# Patient Record
Sex: MALE | Race: OTHER | Employment: FULL TIME | URBAN - METROPOLITAN AREA
[De-identification: names, ages, dates, MRNs, and addresses within clinical notes are randomized per-mention and may not be internally consistent; named-entity substitution may affect disease eponyms.]

---

## 2018-03-13 ENCOUNTER — OFFICE VISIT (OUTPATIENT)
Dept: INTERNAL MEDICINE CLINIC | Facility: CLINIC | Age: 28
End: 2018-03-13
Payer: COMMERCIAL

## 2018-03-13 VITALS
TEMPERATURE: 97.7 F | DIASTOLIC BLOOD PRESSURE: 96 MMHG | OXYGEN SATURATION: 98 % | BODY MASS INDEX: 22.72 KG/M2 | HEIGHT: 74 IN | WEIGHT: 177 LBS | HEART RATE: 95 BPM | SYSTOLIC BLOOD PRESSURE: 142 MMHG

## 2018-03-13 DIAGNOSIS — R03.0 ELEVATED BLOOD PRESSURE READING WITHOUT DIAGNOSIS OF HYPERTENSION: ICD-10-CM

## 2018-03-13 DIAGNOSIS — Z13.6 SCREENING FOR CARDIOVASCULAR CONDITION: ICD-10-CM

## 2018-03-13 DIAGNOSIS — Z00.00 ROUTINE GENERAL MEDICAL EXAMINATION AT A HEALTH CARE FACILITY: ICD-10-CM

## 2018-03-13 DIAGNOSIS — E10.9 TYPE 1 DIABETES MELLITUS WITHOUT COMPLICATION (HCC): Primary | ICD-10-CM

## 2018-03-13 DIAGNOSIS — R09.81 SINUS CONGESTION: ICD-10-CM

## 2018-03-13 PROCEDURE — 99385 PREV VISIT NEW AGE 18-39: CPT | Performed by: NURSE PRACTITIONER

## 2018-03-13 RX ORDER — INSULIN GLARGINE 100 [IU]/ML
10 INJECTION, SOLUTION SUBCUTANEOUS
Qty: 10 ML | Refills: 0 | Status: SHIPPED | OUTPATIENT
Start: 2018-03-13 | End: 2018-08-20 | Stop reason: SDUPTHER

## 2018-03-13 RX ORDER — FLUTICASONE PROPIONATE 50 MCG
2 SPRAY, SUSPENSION (ML) NASAL 2 TIMES DAILY
Qty: 16 G | Refills: 0 | Status: SHIPPED | OUTPATIENT
Start: 2018-03-13 | End: 2018-09-04

## 2018-03-13 RX ORDER — INSULIN GLARGINE 100 [IU]/ML
INJECTION, SOLUTION SUBCUTANEOUS
COMMUNITY
End: 2018-03-13 | Stop reason: SDUPTHER

## 2018-03-13 NOTE — PATIENT INSTRUCTIONS
Thank you for enrolling in [facility]  Please follow the instructions below to securely access your online medical record  OnDeckhart allows you to send messages to your doctor, view your test results, renew your prescriptions, schedule appointments, and more  [address] uses Single Sign on (SSO) Technology for you to log in and access our [facility] SPECIALTY [facility], including WOMN  No more remembering multiple user names and passwords! We are going to guide you through, step by step, to help you set up your [employer] account which will provide access to your OnDeckhart account  How Do I Sign Up? 1  In your Internet browser, go to [email or web address]       2  Click on the   Mix[employer] patient account and then click Dont have an                 Account? Create one now      3  Enter your demographic information and chose a user name (email address) and password  Think of one that is secure and easy to remember  Enter a Referral code if you have one (this is not your OnDeckhart code ) Accept the Terms and Conditions and the Privacy Policy  4  Select your security questions that you will use to reset your password should you forget it  Click Submit  5  Enter your Lvmaet Activation Code exactly as it appears below  You will not need to use this code after you have completed the sign-up process  If you do not sign up before the expiration date, you must request a new code  WOMN Activation Code: [email or web address]  Expires: 3/16/2018 10:41 AM    6  Confirm your email address  An email confirmation was sent to you  Please open that email and click Confirm your Email   You should then be redirected to our [employer] Single sign on page, where you will log on with the user name and password you created! Proceed to the WOMN Icon to view your personal health information          Additional Information  If you have questions, you can e-mail patient  [email or web address] or call [phone] to talk to our customer support staff  Remember, MyChart is NOT to be used for urgent needs  For medical emergencies, dial 911

## 2018-03-13 NOTE — PROGRESS NOTES
Assessment/Plan:    Type 1 diabetes mellitus without complication (HCC)  Blood sugars are stable on insulin  Pt uses basal insulin daily and counts carbs for management of aspart with meals  I have ordered an A1C as given a referral to endocrinology to establish care  I did discuss with pt that as long as his sugars are well controlled we can manage DM in the primary care setting but it is good to establish care with endo in the event that he should ever require further intervention  Elevated blood pressure reading without diagnosis of hypertension  As noted in HPI we did review heart healthy diet and discuss increasing activity but I am having pt return in 2 months to recheck Bp  Pt does have bp cuff at home and I also asked that he check his bp a few times a week and record readings  Sinus congestion  Prescribed flonase prn        Diagnoses and all orders for this visit:    Type 1 diabetes mellitus without complication ([facility] [address] )  -     Comprehensive metabolic panel; Future  -     HEMOGLOBIN A1C W/ EAG ESTIMATION; Future  -     Ambulatory referral to Endocrinology; Future    Routine general medical examination at a health care facility  -     CBC and differential; Future    Screening for cardiovascular condition  -     Lipid panel; Future    Sinus congestion  -     fluticasone (FLONASE) 50 mcg/act nasal spray; 2 sprays into each nostril 2 (two) times a day    Elevated blood pressure reading without diagnosis of hypertension    Other orders  -     insulin aspart (NovoLOG) 100 units/mL injection; Inject under the skin 3 (three) times a day before meals  -     insulin glargine (LANTUS) 100 units/mL subcutaneous injection; Inject under the skin daily at bedtime          Subjective:      Patient ID: Hedy Aldana is a 34 y o  male  Pt is a 34y o  year old male who is here today as a new pt to establish care  PMH includes DM I diagnosed in 2015  We reviewed age based screening recommendations    Because pt is diabetic and he has family history, I have recommended CVD screening with FLP  Pt is due for A1C, is up to date with opthalmologic exams annually  We reviewed the elements of a heart healthy diet including low in saturated fats, several servings of fresh fruits/vegetables, lean protein choices, limiting processed and packaged foods and fried foods, and sufficient intake of fiber  Pt reports that he feels compliant with recommendations  Pt reports desire for increase in physical activity which is decreased during colder winter months but exercise tolerance is good  Pt does get routine dental visits and vaccines are UTD  The following portions of the patient's history were reviewed and updated as appropriate: allergies, current medications, past family history, past medical history, past social history, past surgical history and problem list     Review of Systems   Constitutional: Negative for activity change, appetite change, chills, fatigue, fever and unexpected weight change  HENT: Negative for hearing loss  Eyes: Negative for visual disturbance  Respiratory: Negative for cough, chest tightness and shortness of breath  Cardiovascular: Negative for chest pain, palpitations and leg swelling  Gastrointestinal: Negative for constipation, diarrhea, nausea and vomiting  Genitourinary: Negative for dysuria and frequency  Musculoskeletal: Negative for arthralgias and myalgias  Skin: Negative for rash  Allergic/Immunologic: Positive for environmental allergies  Neurological: Negative for dizziness, weakness, numbness and headaches  Psychiatric/Behavioral: Negative for dysphoric mood and sleep disturbance  The patient is not nervous/anxious            Objective:      /96 (BP Location: Left arm, Patient Position: Sitting, Cuff Size: Standard)   Pulse 95   Temp 97 7 °F (36 5 °C) (Tympanic)   Ht 6' 2" (1 88 m)   Wt 80 3 kg (177 lb)   SpO2 98%   BMI 22 73 kg/m² Physical Exam   Constitutional: He is oriented to person, place, and time  Vital signs are normal  He appears well-developed and well-nourished  HENT:   Right Ear: Hearing, tympanic membrane, external ear and ear canal normal    Left Ear: Hearing, tympanic membrane, external ear and ear canal normal    Nose: Mucosal edema present  Mouth/Throat: Oropharynx is clear and moist and mucous membranes are normal    Post nasal drip noted   Eyes: Conjunctivae and lids are normal  Pupils are equal, round, and reactive to light  Neck: Normal range of motion and full passive range of motion without pain  No JVD present  Carotid bruit is not present  No thyromegaly present  Cardiovascular: Normal rate, regular rhythm, S1 normal, S2 normal, normal heart sounds, intact distal pulses and normal pulses  No murmur heard  Pulmonary/Chest: Effort normal and breath sounds normal    Abdominal: Soft  Normal appearance and bowel sounds are normal  There is no hepatosplenomegaly  There is no tenderness  Musculoskeletal: Normal range of motion  He exhibits no edema  Lymphadenopathy:     He has no cervical adenopathy  Neurological: He is alert and oriented to person, place, and time  He has normal strength and normal reflexes  No sensory deficit  Skin: Skin is warm, dry and intact  Psychiatric: He has a normal mood and affect  His speech is normal and behavior is normal  Judgment and thought content normal  Cognition and memory are normal    Vitals reviewed

## 2018-03-13 NOTE — ASSESSMENT & PLAN NOTE
Blood sugars are stable on insulin  Pt uses basal insulin daily and counts carbs for management of aspart with meals  I have ordered an A1C as given a referral to endocrinology to establish care  I did discuss with pt that as long as his sugars are well controlled we can manage DM in the primary care setting but it is good to establish care with endo in the event that he should ever require further intervention

## 2018-03-13 NOTE — ASSESSMENT & PLAN NOTE
As noted in HPI we did review heart healthy diet and discuss increasing activity but I am having pt return in 2 months to recheck Bp  Pt does have bp cuff at home and I also asked that he check his bp a few times a week and record readings

## 2018-05-16 ENCOUNTER — APPOINTMENT (OUTPATIENT)
Dept: LAB | Facility: CLINIC | Age: 28
End: 2018-05-16
Payer: COMMERCIAL

## 2018-05-16 DIAGNOSIS — E10.9 TYPE 1 DIABETES MELLITUS WITHOUT COMPLICATION (HCC): ICD-10-CM

## 2018-05-16 DIAGNOSIS — Z00.00 ROUTINE GENERAL MEDICAL EXAMINATION AT A HEALTH CARE FACILITY: ICD-10-CM

## 2018-05-16 DIAGNOSIS — Z13.6 SCREENING FOR CARDIOVASCULAR CONDITION: ICD-10-CM

## 2018-05-16 LAB
ALBUMIN SERPL BCP-MCNC: 4.4 G/DL (ref 3.5–5)
ALP SERPL-CCNC: 57 U/L (ref 46–116)
ALT SERPL W P-5'-P-CCNC: 23 U/L (ref 12–78)
ANION GAP SERPL CALCULATED.3IONS-SCNC: 7 MMOL/L (ref 4–13)
AST SERPL W P-5'-P-CCNC: 11 U/L (ref 5–45)
BASOPHILS # BLD AUTO: 0.02 THOUSANDS/ΜL (ref 0–0.1)
BASOPHILS NFR BLD AUTO: 0 % (ref 0–1)
BILIRUB SERPL-MCNC: 1.05 MG/DL (ref 0.2–1)
BUN SERPL-MCNC: 20 MG/DL (ref 5–25)
CALCIUM SERPL-MCNC: 9.3 MG/DL (ref 8.3–10.1)
CHLORIDE SERPL-SCNC: 104 MMOL/L (ref 100–108)
CHOLEST SERPL-MCNC: 173 MG/DL (ref 50–200)
CO2 SERPL-SCNC: 27 MMOL/L (ref 21–32)
CREAT SERPL-MCNC: 0.95 MG/DL (ref 0.6–1.3)
EOSINOPHIL # BLD AUTO: 0.06 THOUSAND/ΜL (ref 0–0.61)
EOSINOPHIL NFR BLD AUTO: 1 % (ref 0–6)
ERYTHROCYTE [DISTWIDTH] IN BLOOD BY AUTOMATED COUNT: 12.2 % (ref 11.6–15.1)
EST. AVERAGE GLUCOSE BLD GHB EST-MCNC: 128 MG/DL
GFR SERPL CREATININE-BSD FRML MDRD: 110 ML/MIN/1.73SQ M
GLUCOSE P FAST SERPL-MCNC: 107 MG/DL (ref 65–99)
HBA1C MFR BLD: 6.1 % (ref 4.2–6.3)
HCT VFR BLD AUTO: 44.7 % (ref 36.5–49.3)
HDLC SERPL-MCNC: 61 MG/DL (ref 40–60)
HGB BLD-MCNC: 14.4 G/DL (ref 12–17)
IMM GRANULOCYTES # BLD AUTO: 0.02 THOUSAND/UL (ref 0–0.2)
IMM GRANULOCYTES NFR BLD AUTO: 0 % (ref 0–2)
LDLC SERPL CALC-MCNC: 98 MG/DL (ref 0–100)
LYMPHOCYTES # BLD AUTO: 1.23 THOUSANDS/ΜL (ref 0.6–4.47)
LYMPHOCYTES NFR BLD AUTO: 19 % (ref 14–44)
MCH RBC QN AUTO: 27.3 PG (ref 26.8–34.3)
MCHC RBC AUTO-ENTMCNC: 32.2 G/DL (ref 31.4–37.4)
MCV RBC AUTO: 85 FL (ref 82–98)
MONOCYTES # BLD AUTO: 0.46 THOUSAND/ΜL (ref 0.17–1.22)
MONOCYTES NFR BLD AUTO: 7 % (ref 4–12)
NEUTROPHILS # BLD AUTO: 4.63 THOUSANDS/ΜL (ref 1.85–7.62)
NEUTS SEG NFR BLD AUTO: 72 % (ref 43–75)
NONHDLC SERPL-MCNC: 112 MG/DL
NRBC BLD AUTO-RTO: 0 /100 WBCS
PLATELET # BLD AUTO: 192 THOUSANDS/UL (ref 149–390)
PMV BLD AUTO: 10.7 FL (ref 8.9–12.7)
POTASSIUM SERPL-SCNC: 4.2 MMOL/L (ref 3.5–5.3)
PROT SERPL-MCNC: 7.9 G/DL (ref 6.4–8.2)
RBC # BLD AUTO: 5.28 MILLION/UL (ref 3.88–5.62)
SODIUM SERPL-SCNC: 138 MMOL/L (ref 136–145)
TRIGL SERPL-MCNC: 68 MG/DL
WBC # BLD AUTO: 6.42 THOUSAND/UL (ref 4.31–10.16)

## 2018-05-16 PROCEDURE — 80053 COMPREHEN METABOLIC PANEL: CPT

## 2018-05-16 PROCEDURE — 85025 COMPLETE CBC W/AUTO DIFF WBC: CPT

## 2018-05-16 PROCEDURE — 83036 HEMOGLOBIN GLYCOSYLATED A1C: CPT

## 2018-05-16 PROCEDURE — 80061 LIPID PANEL: CPT

## 2018-05-16 PROCEDURE — 36415 COLL VENOUS BLD VENIPUNCTURE: CPT

## 2018-05-30 ENCOUNTER — OFFICE VISIT (OUTPATIENT)
Dept: INTERNAL MEDICINE CLINIC | Facility: CLINIC | Age: 28
End: 2018-05-30
Payer: COMMERCIAL

## 2018-05-30 VITALS
BODY MASS INDEX: 22.84 KG/M2 | HEIGHT: 74 IN | SYSTOLIC BLOOD PRESSURE: 136 MMHG | TEMPERATURE: 97.7 F | HEART RATE: 59 BPM | WEIGHT: 178 LBS | OXYGEN SATURATION: 99 % | DIASTOLIC BLOOD PRESSURE: 88 MMHG

## 2018-05-30 DIAGNOSIS — J34.2 DEVIATED SEPTUM: Primary | ICD-10-CM

## 2018-05-30 DIAGNOSIS — R09.81 SINUS CONGESTION: ICD-10-CM

## 2018-05-30 DIAGNOSIS — E10.9 TYPE 1 DIABETES MELLITUS WITHOUT COMPLICATION (HCC): ICD-10-CM

## 2018-05-30 DIAGNOSIS — R03.0 ELEVATED BLOOD PRESSURE READING WITHOUT DIAGNOSIS OF HYPERTENSION: ICD-10-CM

## 2018-05-30 PROCEDURE — 99214 OFFICE O/P EST MOD 30 MIN: CPT | Performed by: NURSE PRACTITIONER

## 2018-05-30 NOTE — ASSESSMENT & PLAN NOTE
Stable, A1C 6 1 controlled with insulin and carb counting  No reported events of hypoglycemia  No changes indicated

## 2018-05-30 NOTE — ASSESSMENT & PLAN NOTE
BP in office is still slightly elevated, but improved from last visit  Pt has been checking BP at home as directed and reports that his BP since resuming regular exercise is now consistently 120/80  Reinforced that he continue with lifestyle modifications as blood pressure control is important particularly in diabetics  Pt to return for f/u in 6 months

## 2018-05-30 NOTE — ASSESSMENT & PLAN NOTE
Pt was given rx for flonase at prior visit  He is using this as needed as daily use was causing nose bleeds  I did explain that exam suggests possible underlying allergies and he can try an OTC anti histamine if symptoms worsen

## 2018-05-30 NOTE — PROGRESS NOTES
Assessment/Plan:    Type 1 diabetes mellitus without complication (HCC)  Stable, A1C 6 1 controlled with insulin and carb counting  No reported events of hypoglycemia  No changes indicated  Sinus congestion  Pt was given rx for flonase at prior visit  He is using this as needed as daily use was causing nose bleeds  I did explain that exam suggests possible underlying allergies and he can try an OTC anti histamine if symptoms worsen  Deviated septum  Pt interested in pursuing surgical correction  Referral to ENT provided  Elevated blood pressure reading without diagnosis of hypertension  BP in office is still slightly elevated, but improved from last visit  Pt has been checking BP at home as directed and reports that his BP since resuming regular exercise is now consistently 120/80  Reinforced that he continue with lifestyle modifications as blood pressure control is important particularly in diabetics  Pt to return for f/u in 6 months  Diagnoses and all orders for this visit:    Deviated septum  -     Ambulatory Referral to Otolaryngology; Future    Sinus congestion    Type 1 diabetes mellitus without complication (HCC)    Elevated blood pressure reading without diagnosis of hypertension          Subjective:      Patient ID: Kenji Whitmore is a 34 y o  male  Pt is a 34y o  year old male who is seen today for 2 month follow up to management of elevated blood pressure at last visit  We reviewed a heart healthy diet and pt was advised to increase activity  Pt states he has been checking his blood pressure since his last visit and initially BP was consistently in the upper 130s/upper 80s  He started running several days per week and now he states his blood pressure is consistently around 120/80  Pt denies chest pain, shortness of breath, palpitations, headache, dizziness  He does complain of difficulty breathing through his nose while running due to his deviated septum    He says he was evaluated in college and was told he should have surgery but he never followed through with it  Blood work results reviewed  PMH of type 1 DM, A1C 6 1, lipid panel is WNL, CBC and CMP unremarkable  The following portions of the patient's history were reviewed and updated as appropriate: allergies, current medications, past family history, past medical history, past social history, past surgical history and problem list     Review of Systems   Constitutional: Negative for activity change, appetite change, chills, fatigue, fever and unexpected weight change  HENT: Negative for hearing loss  Eyes: Negative for visual disturbance  Respiratory: Negative for cough, chest tightness and shortness of breath  Cardiovascular: Negative for chest pain, palpitations and leg swelling  Gastrointestinal: Negative for constipation, diarrhea, nausea and vomiting  Allergic/Immunologic: Positive for environmental allergies  Neurological: Negative for dizziness, weakness, numbness and headaches  Psychiatric/Behavioral: Negative for dysphoric mood and sleep disturbance  The patient is not nervous/anxious  Objective:      /88   Pulse 59   Temp 97 7 °F (36 5 °C)   Ht 6' 2" (1 88 m)   Wt 80 7 kg (178 lb)   SpO2 99%   BMI 22 85 kg/m²          Physical Exam   Constitutional: He is oriented to person, place, and time  Vital signs are normal  He appears well-developed and well-nourished  He is cooperative  HENT:   Head: Normocephalic  Right Ear: Hearing, tympanic membrane, external ear and ear canal normal    Left Ear: Hearing, tympanic membrane, external ear and ear canal normal    Nose: Mucosal edema and septal deviation present  Mouth/Throat: Oropharynx is clear and moist and mucous membranes are normal    Pallor and swelling of nasal mucosa, post nasal drip noted   Eyes: Conjunctivae and lids are normal  Pupils are equal, round, and reactive to light  Neck: No JVD present   Carotid bruit is not present  Cardiovascular: Normal rate, regular rhythm, S1 normal, S2 normal, normal heart sounds and intact distal pulses  No murmur heard  Pulmonary/Chest: Effort normal and breath sounds normal    Abdominal: Soft  Normal appearance  Musculoskeletal: Normal range of motion  He exhibits no edema  Lymphadenopathy:     He has no cervical adenopathy  Neurological: He is alert and oriented to person, place, and time  He has normal strength and normal reflexes  Skin: Skin is warm, dry and intact  Psychiatric: He has a normal mood and affect  His speech is normal and behavior is normal  Judgment and thought content normal  Cognition and memory are normal    Vitals reviewed

## 2018-06-14 DIAGNOSIS — E13.9 DIABETES 1.5, MANAGED AS TYPE 1 (HCC): Primary | ICD-10-CM

## 2018-08-20 DIAGNOSIS — E10.9 TYPE 1 DIABETES MELLITUS WITHOUT COMPLICATION (HCC): ICD-10-CM

## 2018-08-20 DIAGNOSIS — E13.9 DIABETES 1.5, MANAGED AS TYPE 1 (HCC): ICD-10-CM

## 2018-08-20 RX ORDER — INSULIN GLARGINE 100 [IU]/ML
12 INJECTION, SOLUTION SUBCUTANEOUS
Qty: 10 ML | Refills: 5 | Status: SHIPPED | OUTPATIENT
Start: 2018-08-20 | End: 2020-05-21 | Stop reason: SDUPTHER

## 2018-09-04 ENCOUNTER — APPOINTMENT (OUTPATIENT)
Dept: LAB | Facility: CLINIC | Age: 28
End: 2018-09-04
Payer: COMMERCIAL

## 2018-09-04 ENCOUNTER — OFFICE VISIT (OUTPATIENT)
Dept: INTERNAL MEDICINE CLINIC | Facility: CLINIC | Age: 28
End: 2018-09-04
Payer: COMMERCIAL

## 2018-09-04 VITALS
TEMPERATURE: 97.8 F | HEART RATE: 62 BPM | OXYGEN SATURATION: 99 % | DIASTOLIC BLOOD PRESSURE: 88 MMHG | HEIGHT: 74 IN | WEIGHT: 180 LBS | BODY MASS INDEX: 23.1 KG/M2 | SYSTOLIC BLOOD PRESSURE: 138 MMHG

## 2018-09-04 DIAGNOSIS — R10.12 LUQ PAIN: ICD-10-CM

## 2018-09-04 DIAGNOSIS — R10.12 LUQ PAIN: Primary | ICD-10-CM

## 2018-09-04 DIAGNOSIS — E10.9 TYPE 1 DIABETES MELLITUS WITHOUT COMPLICATION (HCC): ICD-10-CM

## 2018-09-04 LAB
ALBUMIN SERPL BCP-MCNC: 4.4 G/DL (ref 3.5–5)
ALP SERPL-CCNC: 55 U/L (ref 46–116)
ALT SERPL W P-5'-P-CCNC: 21 U/L (ref 12–78)
AMYLASE SERPL-CCNC: 52 IU/L (ref 25–115)
ANION GAP SERPL CALCULATED.3IONS-SCNC: 7 MMOL/L (ref 4–13)
AST SERPL W P-5'-P-CCNC: 13 U/L (ref 5–45)
BASOPHILS # BLD AUTO: 0.03 THOUSANDS/ΜL (ref 0–0.1)
BASOPHILS NFR BLD AUTO: 1 % (ref 0–1)
BILIRUB SERPL-MCNC: 0.78 MG/DL (ref 0.2–1)
BUN SERPL-MCNC: 18 MG/DL (ref 5–25)
CALCIUM SERPL-MCNC: 9.3 MG/DL (ref 8.3–10.1)
CHLORIDE SERPL-SCNC: 102 MMOL/L (ref 100–108)
CO2 SERPL-SCNC: 26 MMOL/L (ref 21–32)
CREAT SERPL-MCNC: 0.93 MG/DL (ref 0.6–1.3)
EOSINOPHIL # BLD AUTO: 0.04 THOUSAND/ΜL (ref 0–0.61)
EOSINOPHIL NFR BLD AUTO: 1 % (ref 0–6)
ERYTHROCYTE [DISTWIDTH] IN BLOOD BY AUTOMATED COUNT: 12 % (ref 11.6–15.1)
GFR SERPL CREATININE-BSD FRML MDRD: 113 ML/MIN/1.73SQ M
GLUCOSE P FAST SERPL-MCNC: 116 MG/DL (ref 65–99)
HCT VFR BLD AUTO: 43 % (ref 36.5–49.3)
HGB BLD-MCNC: 13.7 G/DL (ref 12–17)
IMM GRANULOCYTES # BLD AUTO: 0.01 THOUSAND/UL (ref 0–0.2)
IMM GRANULOCYTES NFR BLD AUTO: 0 % (ref 0–2)
LIPASE SERPL-CCNC: 66 U/L (ref 73–393)
LYMPHOCYTES # BLD AUTO: 1.24 THOUSANDS/ΜL (ref 0.6–4.47)
LYMPHOCYTES NFR BLD AUTO: 29 % (ref 14–44)
MCH RBC QN AUTO: 27 PG (ref 26.8–34.3)
MCHC RBC AUTO-ENTMCNC: 31.9 G/DL (ref 31.4–37.4)
MCV RBC AUTO: 85 FL (ref 82–98)
MONOCYTES # BLD AUTO: 0.4 THOUSAND/ΜL (ref 0.17–1.22)
MONOCYTES NFR BLD AUTO: 9 % (ref 4–12)
NEUTROPHILS # BLD AUTO: 2.6 THOUSANDS/ΜL (ref 1.85–7.62)
NEUTS SEG NFR BLD AUTO: 60 % (ref 43–75)
NRBC BLD AUTO-RTO: 0 /100 WBCS
PLATELET # BLD AUTO: 201 THOUSANDS/UL (ref 149–390)
PMV BLD AUTO: 10.9 FL (ref 8.9–12.7)
POTASSIUM SERPL-SCNC: 4.2 MMOL/L (ref 3.5–5.3)
PROT SERPL-MCNC: 8.1 G/DL (ref 6.4–8.2)
RBC # BLD AUTO: 5.08 MILLION/UL (ref 3.88–5.62)
SODIUM SERPL-SCNC: 135 MMOL/L (ref 136–145)
WBC # BLD AUTO: 4.32 THOUSAND/UL (ref 4.31–10.16)

## 2018-09-04 PROCEDURE — 85025 COMPLETE CBC W/AUTO DIFF WBC: CPT

## 2018-09-04 PROCEDURE — 3008F BODY MASS INDEX DOCD: CPT | Performed by: NURSE PRACTITIONER

## 2018-09-04 PROCEDURE — 36415 COLL VENOUS BLD VENIPUNCTURE: CPT

## 2018-09-04 PROCEDURE — 80053 COMPREHEN METABOLIC PANEL: CPT

## 2018-09-04 PROCEDURE — 82150 ASSAY OF AMYLASE: CPT

## 2018-09-04 PROCEDURE — 83690 ASSAY OF LIPASE: CPT

## 2018-09-04 PROCEDURE — 99213 OFFICE O/P EST LOW 20 MIN: CPT | Performed by: NURSE PRACTITIONER

## 2018-09-04 PROCEDURE — 1036F TOBACCO NON-USER: CPT | Performed by: NURSE PRACTITIONER

## 2018-09-04 NOTE — PROGRESS NOTES
Assessment/Plan:    Type 1 diabetes mellitus without complication (HCC)  Lab Results   Component Value Date    HGBA1C 6 1 05/16/2018       No results for input(s): POCGLU in the last 72 hours  Stable, well controlled with use of insulin  Blood Sugar Average: Last 72 hrs:      LUQ pain  Exam is unremarkable  I did express to the patient that I suspect he may be having intermittent acid reflux related to his dietary changes in recent months due to travel and celebrating  I recommended that next time he experiences symptoms he try tums and see if this helps alleviate discomfort  I did also order blood work to evaluate pancreatic and liver enzymes and blood counts  Will f/u pending results  If testing is all negative and symptoms are not alleviated by tums, will discuss if further evaluation is necessary  Diagnoses and all orders for this visit:    LUQ pain  -     Comprehensive metabolic panel; Future  -     CBC and differential; Future  -     Amylase; Future  -     Lipase; Future    Type 1 diabetes mellitus without complication (HCC)          Subjective:      Patient ID: Aníbal Owens is a 34 y o  male  Pt is a 34 y o  y/o male who is seen today for evaluation of LUQ pain since late June/July  He has no associated symptoms of nausea/vomiting, diarrhea, constipation, fever/chills, appetite loss, dysuria  Pt states that when symptoms started, it was fairly regular for about 4 days and has since become more intermittent  He describes the pain as a mild discomfort that is worsened with laying flat and coughing  He states that there have been occasions when he was unable to go running because of this pain  He has PMH of type 1 diabetes controlled on insulin but he states his blood sugars have been well controlled  He does report that he has been traveling a lot over the summer so his diet has been higher in red meat and he has had more alcohol than he typically does          Abdominal Pain Pertinent negatives include no arthralgias, constipation, diarrhea, dysuria, fever, headaches, myalgias, nausea or vomiting  The following portions of the patient's history were reviewed and updated as appropriate: allergies, current medications, past family history, past medical history, past social history, past surgical history and problem list     Review of Systems   Constitutional: Negative for activity change, appetite change, chills, fatigue and fever  HENT: Negative for congestion and sore throat  Respiratory: Negative for cough and shortness of breath  Cardiovascular: Negative for chest pain, palpitations and leg swelling  Gastrointestinal: Positive for abdominal pain  Negative for blood in stool, constipation, diarrhea, nausea, rectal pain and vomiting  Genitourinary: Negative for dysuria  Musculoskeletal: Negative for arthralgias and myalgias  Skin: Negative for rash  Neurological: Negative for dizziness, light-headedness and headaches  Psychiatric/Behavioral: Negative for dysphoric mood and sleep disturbance  The patient is not nervous/anxious  Objective:      /88 (BP Location: Left arm, Patient Position: Sitting, Cuff Size: Adult)   Pulse 62   Temp 97 8 °F (36 6 °C) (Tympanic)   Ht 6' 2" (1 88 m)   Wt 81 6 kg (180 lb)   SpO2 99%   BMI 23 11 kg/m²          Physical Exam   Constitutional: He is oriented to person, place, and time  Vital signs are normal  He appears well-developed and well-nourished  He is cooperative  HENT:   Head: Normocephalic  Right Ear: Hearing and tympanic membrane normal    Left Ear: Hearing and tympanic membrane normal    Nose: Mucosal edema present  Mouth/Throat: Oropharynx is clear and moist and mucous membranes are normal    Eyes: Conjunctivae and lids are normal    Neck: Normal range of motion and full passive range of motion without pain  No JVD present  Carotid bruit is not present     Cardiovascular: Normal rate, regular rhythm, normal heart sounds and normal pulses  Pulmonary/Chest: Effort normal and breath sounds normal    Abdominal: Soft  Normal appearance and bowel sounds are normal  There is no hepatosplenomegaly  There is no tenderness  Musculoskeletal: Normal range of motion  Lymphadenopathy:        Head (right side): No submental, no submandibular, no tonsillar, no preauricular, no posterior auricular and no occipital adenopathy present  Head (left side): No submental, no submandibular, no tonsillar, no preauricular, no posterior auricular and no occipital adenopathy present  He has no cervical adenopathy  Neurological: He is alert and oriented to person, place, and time  He has normal strength and normal reflexes  Skin: Skin is warm, dry and intact  There are several scattered blue skin lesions noted; pt reports that these are new in recent months  Lesions are raised but depressible, they are non-tender, all approx 1-2 mm  Non-tender  One on the R dorasal hand below the 5th mcp joint, one on the R ventral forearm, one on the R posterior calf, and 3-4 on the left lateral low back  Psychiatric: He has a normal mood and affect  His speech is normal and behavior is normal  Judgment and thought content normal  Cognition and memory are normal    Vitals reviewed

## 2018-09-04 NOTE — ASSESSMENT & PLAN NOTE
Lab Results   Component Value Date    HGBA1C 6 1 05/16/2018       No results for input(s): POCGLU in the last 72 hours      Stable, well controlled with use of insulin  Blood Sugar Average: Last 72 hrs:

## 2018-12-11 ENCOUNTER — LAB (OUTPATIENT)
Dept: LAB | Facility: CLINIC | Age: 28
End: 2018-12-11
Payer: COMMERCIAL

## 2018-12-11 ENCOUNTER — OFFICE VISIT (OUTPATIENT)
Dept: INTERNAL MEDICINE CLINIC | Facility: CLINIC | Age: 28
End: 2018-12-11
Payer: COMMERCIAL

## 2018-12-11 VITALS
SYSTOLIC BLOOD PRESSURE: 132 MMHG | OXYGEN SATURATION: 98 % | WEIGHT: 185 LBS | TEMPERATURE: 97 F | HEART RATE: 50 BPM | HEIGHT: 74 IN | DIASTOLIC BLOOD PRESSURE: 74 MMHG | BODY MASS INDEX: 23.74 KG/M2

## 2018-12-11 DIAGNOSIS — R07.89 CHEST DISCOMFORT: Primary | ICD-10-CM

## 2018-12-11 DIAGNOSIS — R07.89 CHEST DISCOMFORT: ICD-10-CM

## 2018-12-11 DIAGNOSIS — R03.0 ELEVATED BLOOD PRESSURE READING WITHOUT DIAGNOSIS OF HYPERTENSION: ICD-10-CM

## 2018-12-11 LAB
ALBUMIN SERPL BCP-MCNC: 4.3 G/DL (ref 3.5–5)
ALP SERPL-CCNC: 50 U/L (ref 46–116)
ALT SERPL W P-5'-P-CCNC: 18 U/L (ref 12–78)
ANION GAP SERPL CALCULATED.3IONS-SCNC: 8 MMOL/L (ref 4–13)
AST SERPL W P-5'-P-CCNC: 7 U/L (ref 5–45)
BILIRUB SERPL-MCNC: 0.78 MG/DL (ref 0.2–1)
BUN SERPL-MCNC: 16 MG/DL (ref 5–25)
CALCIUM SERPL-MCNC: 9.1 MG/DL (ref 8.3–10.1)
CHLORIDE SERPL-SCNC: 105 MMOL/L (ref 100–108)
CO2 SERPL-SCNC: 29 MMOL/L (ref 21–32)
CREAT SERPL-MCNC: 0.93 MG/DL (ref 0.6–1.3)
GFR SERPL CREATININE-BSD FRML MDRD: 113 ML/MIN/1.73SQ M
GLUCOSE P FAST SERPL-MCNC: 129 MG/DL (ref 65–99)
POTASSIUM SERPL-SCNC: 4.8 MMOL/L (ref 3.5–5.3)
PROT SERPL-MCNC: 7.7 G/DL (ref 6.4–8.2)
SODIUM SERPL-SCNC: 142 MMOL/L (ref 136–145)
T4 FREE SERPL-MCNC: 1.05 NG/DL (ref 0.76–1.46)
TSH SERPL DL<=0.05 MIU/L-ACNC: 2.07 UIU/ML (ref 0.36–3.74)

## 2018-12-11 PROCEDURE — 84443 ASSAY THYROID STIM HORMONE: CPT

## 2018-12-11 PROCEDURE — 84439 ASSAY OF FREE THYROXINE: CPT

## 2018-12-11 PROCEDURE — 1036F TOBACCO NON-USER: CPT | Performed by: NURSE PRACTITIONER

## 2018-12-11 PROCEDURE — 36415 COLL VENOUS BLD VENIPUNCTURE: CPT

## 2018-12-11 PROCEDURE — 80053 COMPREHEN METABOLIC PANEL: CPT

## 2018-12-11 PROCEDURE — 99214 OFFICE O/P EST MOD 30 MIN: CPT | Performed by: NURSE PRACTITIONER

## 2018-12-11 PROCEDURE — 93000 ELECTROCARDIOGRAM COMPLETE: CPT | Performed by: NURSE PRACTITIONER

## 2018-12-11 PROCEDURE — 3008F BODY MASS INDEX DOCD: CPT | Performed by: NURSE PRACTITIONER

## 2018-12-11 RX ORDER — METOPROLOL TARTRATE 50 MG/1
50 TABLET, FILM COATED ORAL 2 TIMES DAILY
COMMUNITY
Start: 2018-12-06 | End: 2019-06-12 | Stop reason: SINTOL

## 2018-12-11 NOTE — ASSESSMENT & PLAN NOTE
Lab Results   Component Value Date    HGBA1C 6 1 05/16/2018       No results for input(s): POCGLU in the last 72 hours  Blood Sugar Average: Last 72 hrs:   blood sugar was elevated in emergency room evaluation, patient states that otherwise his blood sugars have been well controlled  He has an insulin pump and has not had any episodes of hyper or hypoglycemia  Repeat CMP ordered

## 2018-12-11 NOTE — PROGRESS NOTES
Assessment/Plan:    Type 1 diabetes mellitus without complication (HCC)  Lab Results   Component Value Date    HGBA1C 6 1 05/16/2018       No results for input(s): POCGLU in the last 72 hours  Blood Sugar Average: Last 72 hrs:   blood sugar was elevated in emergency room evaluation, patient states that otherwise his blood sugars have been well controlled  He has an insulin pump and has not had any episodes of hyper or hypoglycemia  Repeat CMP ordered  Chest discomfort  Symptoms described in HPI have not reoccurred since his ER evaluation  I did repeat an EKG in the office today which showed sinus bradycardia with questionable incomplete right bundle branch block  Patient advised to stop metoprolol  He is becoming symptomatic of fatigue heartburn and poor exercise tolerance with this medication  He does not have a history of tachycardia and his vitals in the emergency room appear to have been consistent with acute anxiety  I did repeat a CMP in add on TSH and T4 for additional evaluation  Patient is instructed to call the office if symptoms reoccur, then consider additional evaluation as indicated  Elevated blood pressure reading without diagnosis of hypertension  Note this time patient is advised to stop taking metoprolol  His heart rate is well on it and he is having adverse effects including fatigue, heartburn, and poor exercise tolerance  His blood pressure prior to today's visit have been mildly elevated  Have asked that he come back in 1-2 months and we can recheck his blood pressure  If remains elevated at that time, we will consider alternate therapy  Diagnoses and all orders for this visit:    Chest discomfort  -     POCT ECG  -     Comprehensive metabolic panel; Future  -     TSH, 3rd generation; Future  -     T4, free; Future    Elevated blood pressure reading without diagnosis of hypertension    Other orders  -     metoprolol tartrate (LOPRESSOR) 50 mg tablet;  Take 50 mg by mouth 2 (two) times a day          Subjective:      Patient ID: Sonya Moses is a 34 y o  male  Patient is a 56-year-old male here today for evaluation of abnormal symptoms that occurred recently  Patient was away on a business trip in [place] when he had 2 episodes of a rash starting from the diaphragm up  Each episode lasted only seconds and was self-limited  There is no associated pain or pressure, he had no SOB, dizziness, blurred vision, numbness, tingling, nausea or vomiting  Past medical history includes type 1 diabetes  Patient states that his blood sugar had been stable the days prior to these episodes  Patient did end up going to Urgent Care for evaluation, Urgent Care sent him to the emergency room for evaluation  His emergency room evaluation included blood work, an EKG, chest x-ray  His blood pressure in the emergency room was elevated, patient believes likely related to concern over his symptoms  His EKG showed a questionable right bundle branch block and his chest x-ray was normal   His blood work was unremarkable, his blood glucose was elevated at the time of his visit however at [address] [place] [address] Patient was started on metoprolol and discharged once his blood pressure stabilized  He states he has not had any recurrence of these symptoms since his evaluation in the emergency room  He has had increase in heartburn symptoms since starting the metoprolol however  The following portions of the patient's history were reviewed and updated as appropriate: allergies, current medications, past family history, past medical history, past social history, past surgical history and problem list     Review of Systems   Constitutional: Negative for activity change, appetite change, chills, fatigue, fever and unexpected weight change  HENT: Negative for congestion and hearing loss  Eyes: Negative for visual disturbance  Respiratory: Negative for cough, chest tightness and shortness of breath  Cardiovascular: Negative for chest pain, palpitations and leg swelling  Gastrointestinal: Negative for constipation, diarrhea, nausea and vomiting  Heartburn   Genitourinary: Negative for dysuria and frequency  Musculoskeletal: Negative for arthralgias and myalgias  Skin: Negative for rash  Allergic/Immunologic: Negative for environmental allergies  Neurological: Negative for dizziness, weakness, numbness and headaches  Psychiatric/Behavioral: Negative for dysphoric mood and sleep disturbance  The patient is not nervous/anxious  Objective:      /74   Pulse (!) 50   Temp (!) 97 °F (36 1 °C)   Ht 6' 2" (1 88 m)   Wt 83 9 kg (185 lb)   SpO2 98%   BMI 23 75 kg/m²          Physical Exam   Constitutional: He is oriented to person, place, and time  Vital signs are normal  He appears well-developed and well-nourished  He is cooperative  HENT:   Right Ear: Hearing, tympanic membrane, external ear and ear canal normal    Left Ear: Hearing, tympanic membrane, external ear and ear canal normal    Nose: Mucosal edema present  Mouth/Throat: Oropharynx is clear and moist and mucous membranes are normal    Eyes: Pupils are equal, round, and reactive to light  Conjunctivae, EOM and lids are normal    Neck: No JVD present  Carotid bruit is not present  No thyromegaly present  Cardiovascular: Regular rhythm, S1 normal, S2 normal, normal heart sounds and intact distal pulses  Bradycardia present  No murmur heard  Pulmonary/Chest: Effort normal and breath sounds normal    Abdominal: Soft  Normal appearance and bowel sounds are normal  There is no tenderness  Musculoskeletal: Normal range of motion  He exhibits no edema  Lymphadenopathy:        Head (right side): No submental, no submandibular, no tonsillar, no preauricular, no posterior auricular and no occipital adenopathy present          Head (left side): No submental, no submandibular, no tonsillar, no preauricular, no posterior auricular and no occipital adenopathy present  He has no cervical adenopathy  Neurological: He is alert and oriented to person, place, and time  He has normal strength and normal reflexes  No sensory deficit  Skin: Skin is warm, dry and intact  Psychiatric: He has a normal mood and affect  His speech is normal and behavior is normal  Judgment and thought content normal  Cognition and memory are normal    Vitals reviewed

## 2018-12-11 NOTE — ASSESSMENT & PLAN NOTE
Note this time patient is advised to stop taking metoprolol  His heart rate is well on it and he is having adverse effects including fatigue, heartburn, and poor exercise tolerance  His blood pressure prior to today's visit have been mildly elevated  Have asked that he come back in 1-2 months and we can recheck his blood pressure  If remains elevated at that time, we will consider alternate therapy

## 2018-12-11 NOTE — ASSESSMENT & PLAN NOTE
Symptoms described in HPI have not reoccurred since his ER evaluation  I did repeat an EKG in the office today which showed sinus bradycardia with questionable incomplete right bundle branch block  Patient advised to stop metoprolol  He is becoming symptomatic of fatigue heartburn and poor exercise tolerance with this medication  He does not have a history of tachycardia and his vitals in the emergency room appear to have been consistent with acute anxiety  I did repeat a CMP in add on TSH and T4 for additional evaluation  Patient is instructed to call the office if symptoms reoccur, then consider additional evaluation as indicated

## 2019-01-22 DIAGNOSIS — E10.9 TYPE 1 DIABETES MELLITUS WITHOUT COMPLICATION (HCC): Primary | ICD-10-CM

## 2019-01-25 DIAGNOSIS — E10.9 TYPE 1 DIABETES MELLITUS WITHOUT COMPLICATION (HCC): Primary | ICD-10-CM

## 2019-02-15 DIAGNOSIS — E10.9 TYPE 1 DIABETES MELLITUS WITHOUT COMPLICATION (HCC): ICD-10-CM

## 2019-02-15 NOTE — TELEPHONE ENCOUNTER
Order was called in for patient but it was sent for testing 4x/day and he really tests 8x/day please resend the order over for the patient

## 2019-05-29 ENCOUNTER — OFFICE VISIT (OUTPATIENT)
Dept: INTERNAL MEDICINE CLINIC | Facility: CLINIC | Age: 29
End: 2019-05-29
Payer: COMMERCIAL

## 2019-05-29 VITALS
WEIGHT: 179 LBS | HEART RATE: 69 BPM | SYSTOLIC BLOOD PRESSURE: 132 MMHG | TEMPERATURE: 98 F | OXYGEN SATURATION: 98 % | DIASTOLIC BLOOD PRESSURE: 94 MMHG | BODY MASS INDEX: 22.97 KG/M2 | HEIGHT: 74 IN | RESPIRATION RATE: 18 BRPM

## 2019-05-29 DIAGNOSIS — R14.2 BELCHING: ICD-10-CM

## 2019-05-29 DIAGNOSIS — E10.9 TYPE 1 DIABETES MELLITUS WITHOUT COMPLICATION (HCC): Primary | ICD-10-CM

## 2019-05-29 LAB — SL AMB POCT HEMOGLOBIN AIC: 6 (ref ?–6.5)

## 2019-05-29 PROCEDURE — 83036 HEMOGLOBIN GLYCOSYLATED A1C: CPT | Performed by: NURSE PRACTITIONER

## 2019-05-29 PROCEDURE — 99214 OFFICE O/P EST MOD 30 MIN: CPT | Performed by: NURSE PRACTITIONER

## 2019-05-29 PROCEDURE — 3044F HG A1C LEVEL LT 7.0%: CPT | Performed by: NURSE PRACTITIONER

## 2019-06-03 DIAGNOSIS — E10.9 TYPE 1 DIABETES MELLITUS WITHOUT COMPLICATION (HCC): ICD-10-CM

## 2019-06-03 RX ORDER — PEN NEEDLE, DIABETIC 32GX 5/32"
NEEDLE, DISPOSABLE MISCELLANEOUS
Qty: 200 EACH | Refills: 0 | Status: SHIPPED | OUTPATIENT
Start: 2019-06-03 | End: 2019-09-27 | Stop reason: SDUPTHER

## 2019-06-12 ENCOUNTER — OFFICE VISIT (OUTPATIENT)
Dept: INTERNAL MEDICINE CLINIC | Facility: CLINIC | Age: 29
End: 2019-06-12
Payer: COMMERCIAL

## 2019-06-12 VITALS
OXYGEN SATURATION: 98 % | SYSTOLIC BLOOD PRESSURE: 132 MMHG | HEART RATE: 65 BPM | DIASTOLIC BLOOD PRESSURE: 90 MMHG | WEIGHT: 181 LBS | HEIGHT: 74 IN | RESPIRATION RATE: 18 BRPM | BODY MASS INDEX: 23.23 KG/M2 | TEMPERATURE: 97.8 F

## 2019-06-12 DIAGNOSIS — J34.2 DEVIATED SEPTUM: Primary | ICD-10-CM

## 2019-06-12 DIAGNOSIS — R14.2 BELCHING: ICD-10-CM

## 2019-06-12 PROCEDURE — 3008F BODY MASS INDEX DOCD: CPT | Performed by: NURSE PRACTITIONER

## 2019-06-12 PROCEDURE — 1036F TOBACCO NON-USER: CPT | Performed by: NURSE PRACTITIONER

## 2019-06-12 PROCEDURE — 99214 OFFICE O/P EST MOD 30 MIN: CPT | Performed by: NURSE PRACTITIONER

## 2019-08-01 DIAGNOSIS — E10.9 TYPE 1 DIABETES MELLITUS WITHOUT COMPLICATION (HCC): ICD-10-CM

## 2019-09-27 DIAGNOSIS — E10.9 TYPE 1 DIABETES MELLITUS WITHOUT COMPLICATION (HCC): ICD-10-CM

## 2019-09-27 RX ORDER — INSULIN GLARGINE 100 [IU]/ML
INJECTION, SOLUTION SUBCUTANEOUS
Qty: 15 ML | Refills: 4 | Status: SHIPPED | OUTPATIENT
Start: 2019-09-27 | End: 2020-05-21 | Stop reason: SDUPTHER

## 2019-09-27 RX ORDER — PEN NEEDLE, DIABETIC 32GX 5/32"
NEEDLE, DISPOSABLE MISCELLANEOUS
Qty: 200 EACH | Refills: 0 | Status: SHIPPED | OUTPATIENT
Start: 2019-09-27 | End: 2020-03-12

## 2019-10-02 DIAGNOSIS — E10.9 TYPE 1 DIABETES MELLITUS WITHOUT COMPLICATION (HCC): ICD-10-CM

## 2019-10-11 ENCOUNTER — TELEPHONE (OUTPATIENT)
Dept: INTERNAL MEDICINE CLINIC | Facility: CLINIC | Age: 29
End: 2019-10-11

## 2019-10-11 DIAGNOSIS — E10.9 TYPE 1 DIABETES MELLITUS WITHOUT COMPLICATION (HCC): Primary | ICD-10-CM

## 2019-10-11 DIAGNOSIS — E10.9 TYPE 1 DIABETES MELLITUS WITHOUT COMPLICATION (HCC): ICD-10-CM

## 2019-10-11 RX ORDER — INSULIN ASPART 100 [IU]/ML
INJECTION, SOLUTION INTRAVENOUS; SUBCUTANEOUS
Qty: 15 ML | Refills: 1 | Status: SHIPPED | OUTPATIENT
Start: 2019-10-11 | End: 2019-10-11 | Stop reason: SDUPTHER

## 2019-10-11 NOTE — TELEPHONE ENCOUNTER
Please call to confirm if this is for the new order I placed  If they need a range give them a verbal for 7-10 units adjusting for carb intake 1:15 ratio

## 2019-10-11 NOTE — TELEPHONE ENCOUNTER
Called & spoke with pharmacist  They received the new order with the above range/ratio for pts insulin

## 2019-11-29 DIAGNOSIS — E10.9 TYPE 1 DIABETES MELLITUS WITHOUT COMPLICATION (HCC): ICD-10-CM

## 2020-01-12 DIAGNOSIS — E10.9 TYPE 1 DIABETES MELLITUS WITHOUT COMPLICATION (HCC): ICD-10-CM

## 2020-03-12 DIAGNOSIS — E10.9 TYPE 1 DIABETES MELLITUS WITHOUT COMPLICATION (HCC): ICD-10-CM

## 2020-03-12 RX ORDER — PEN NEEDLE, DIABETIC 32GX 5/32"
NEEDLE, DISPOSABLE MISCELLANEOUS
Qty: 200 EACH | Refills: 0 | Status: SHIPPED | OUTPATIENT
Start: 2020-03-12 | End: 2020-06-30

## 2020-05-19 ENCOUNTER — TELEMEDICINE (OUTPATIENT)
Dept: INTERNAL MEDICINE CLINIC | Facility: CLINIC | Age: 30
End: 2020-05-19
Payer: COMMERCIAL

## 2020-05-19 DIAGNOSIS — E10.9 TYPE 1 DIABETES MELLITUS WITHOUT COMPLICATION (HCC): Primary | ICD-10-CM

## 2020-05-19 DIAGNOSIS — R03.0 ELEVATED BLOOD PRESSURE READING WITHOUT DIAGNOSIS OF HYPERTENSION: ICD-10-CM

## 2020-05-19 PROCEDURE — 1036F TOBACCO NON-USER: CPT | Performed by: NURSE PRACTITIONER

## 2020-05-19 PROCEDURE — 99214 OFFICE O/P EST MOD 30 MIN: CPT | Performed by: NURSE PRACTITIONER

## 2020-05-21 ENCOUNTER — TELEMEDICINE (OUTPATIENT)
Dept: ENDOCRINOLOGY | Facility: CLINIC | Age: 30
End: 2020-05-21
Payer: COMMERCIAL

## 2020-05-21 DIAGNOSIS — Z79.4 CURRENT USE OF INSULIN (HCC): Primary | ICD-10-CM

## 2020-05-21 DIAGNOSIS — E10.649 TYPE 1 DIABETES MELLITUS WITH HYPOGLYCEMIA AND WITHOUT COMA (HCC): ICD-10-CM

## 2020-05-21 DIAGNOSIS — E10.9 TYPE 1 DIABETES MELLITUS WITHOUT COMPLICATION (HCC): ICD-10-CM

## 2020-05-21 PROCEDURE — 99215 OFFICE O/P EST HI 40 MIN: CPT | Performed by: INTERNAL MEDICINE

## 2020-05-21 RX ORDER — BLOOD-GLUCOSE TRANSMITTER
EACH MISCELLANEOUS
Qty: 1 EACH | Refills: 3 | Status: SHIPPED | OUTPATIENT
Start: 2020-05-21 | End: 2021-06-29 | Stop reason: SDUPTHER

## 2020-05-21 RX ORDER — BLOOD-GLUCOSE SENSOR
EACH MISCELLANEOUS
Qty: 1 EACH | Refills: 12 | Status: SHIPPED | OUTPATIENT
Start: 2020-05-21 | End: 2021-06-29 | Stop reason: SDUPTHER

## 2020-06-16 DIAGNOSIS — Z79.4 CURRENT USE OF INSULIN (HCC): ICD-10-CM

## 2020-06-16 DIAGNOSIS — E10.649 TYPE 1 DIABETES MELLITUS WITH HYPOGLYCEMIA AND WITHOUT COMA (HCC): ICD-10-CM

## 2020-06-30 DIAGNOSIS — E10.9 TYPE 1 DIABETES MELLITUS WITHOUT COMPLICATION (HCC): ICD-10-CM

## 2020-06-30 RX ORDER — PEN NEEDLE, DIABETIC 32GX 5/32"
NEEDLE, DISPOSABLE MISCELLANEOUS
Qty: 200 EACH | Refills: 0 | Status: SHIPPED | OUTPATIENT
Start: 2020-06-30 | End: 2020-10-20

## 2020-08-11 ENCOUNTER — TELEPHONE (OUTPATIENT)
Dept: ENDOCRINOLOGY | Facility: CLINIC | Age: 30
End: 2020-08-11

## 2020-08-11 NOTE — TELEPHONE ENCOUNTER
I filled out the [facility] form as best as I could  I did not have recent labs - Dr Jesu Huff reached out to the pt to please get A1C but that was not done    Will fax the last OV to [facility] also

## 2020-09-21 DIAGNOSIS — E10.9 TYPE 1 DIABETES MELLITUS WITHOUT COMPLICATION (HCC): Primary | ICD-10-CM

## 2020-10-20 DIAGNOSIS — E10.9 TYPE 1 DIABETES MELLITUS WITHOUT COMPLICATION (HCC): ICD-10-CM

## 2020-10-20 RX ORDER — PEN NEEDLE, DIABETIC 32GX 5/32"
NEEDLE, DISPOSABLE MISCELLANEOUS
Qty: 200 EACH | Refills: 0 | Status: SHIPPED | OUTPATIENT
Start: 2020-10-20 | End: 2021-01-04

## 2021-01-04 DIAGNOSIS — E10.9 TYPE 1 DIABETES MELLITUS WITHOUT COMPLICATION (HCC): ICD-10-CM

## 2021-01-04 RX ORDER — PEN NEEDLE, DIABETIC 32GX 5/32"
NEEDLE, DISPOSABLE MISCELLANEOUS
Qty: 200 EACH | Refills: 0 | Status: SHIPPED | OUTPATIENT
Start: 2021-01-04 | End: 2021-06-09

## 2021-02-18 DIAGNOSIS — Z20.822 EXPOSURE TO COVID-19 VIRUS: Primary | ICD-10-CM

## 2021-03-10 DIAGNOSIS — Z23 ENCOUNTER FOR IMMUNIZATION: ICD-10-CM

## 2021-03-15 ENCOUNTER — IMMUNIZATIONS (OUTPATIENT)
Dept: FAMILY MEDICINE CLINIC | Facility: HOSPITAL | Age: 31
End: 2021-03-15

## 2021-03-15 DIAGNOSIS — Z23 ENCOUNTER FOR IMMUNIZATION: Primary | ICD-10-CM

## 2021-03-15 PROCEDURE — 0001A SARS-COV-2 / COVID-19 MRNA VACCINE (PFIZER-BIONTECH) 30 MCG: CPT

## 2021-03-15 PROCEDURE — 91300 SARS-COV-2 / COVID-19 MRNA VACCINE (PFIZER-BIONTECH) 30 MCG: CPT

## 2021-04-05 ENCOUNTER — IMMUNIZATIONS (OUTPATIENT)
Dept: FAMILY MEDICINE CLINIC | Facility: HOSPITAL | Age: 31
End: 2021-04-05

## 2021-04-05 DIAGNOSIS — Z23 ENCOUNTER FOR IMMUNIZATION: Primary | ICD-10-CM

## 2021-04-05 PROCEDURE — 0002A SARS-COV-2 / COVID-19 MRNA VACCINE (PFIZER-BIONTECH) 30 MCG: CPT

## 2021-04-05 PROCEDURE — 91300 SARS-COV-2 / COVID-19 MRNA VACCINE (PFIZER-BIONTECH) 30 MCG: CPT

## 2021-04-15 ENCOUNTER — TELEPHONE (OUTPATIENT)
Dept: INTERNAL MEDICINE CLINIC | Facility: CLINIC | Age: 31
End: 2021-04-15

## 2021-04-15 NOTE — TELEPHONE ENCOUNTER
I called & spoke with pt  He states that he has labs to get done and then he will call back to schedule an appt

## 2021-05-25 DIAGNOSIS — Z79.4 CURRENT USE OF INSULIN (HCC): ICD-10-CM

## 2021-05-25 DIAGNOSIS — E10.649 TYPE 1 DIABETES MELLITUS WITH HYPOGLYCEMIA AND WITHOUT COMA (HCC): ICD-10-CM

## 2021-05-26 RX ORDER — INSULIN ASPART 100 [IU]/ML
INJECTION, SOLUTION INTRAVENOUS; SUBCUTANEOUS
Qty: 15 ML | Refills: 0 | Status: SHIPPED | OUTPATIENT
Start: 2021-05-26 | End: 2021-08-02

## 2021-05-26 RX ORDER — INSULIN GLARGINE 100 [IU]/ML
INJECTION, SOLUTION SUBCUTANEOUS
Qty: 15 ML | Refills: 3 | OUTPATIENT
Start: 2021-05-26

## 2021-05-26 RX ORDER — INSULIN ASPART 100 [IU]/ML
INJECTION, SOLUTION INTRAVENOUS; SUBCUTANEOUS
Qty: 15 ML | Refills: 2 | OUTPATIENT
Start: 2021-05-26

## 2021-05-26 RX ORDER — INSULIN GLARGINE 100 [IU]/ML
INJECTION, SOLUTION SUBCUTANEOUS
Qty: 15 ML | Refills: 0 | Status: SHIPPED | OUTPATIENT
Start: 2021-05-26 | End: 2021-06-29 | Stop reason: SDUPTHER

## 2021-05-26 NOTE — TELEPHONE ENCOUNTER
Not seen since Telemed visit in 5/21/2020, and no appointment  I will not refill insulin  He needs to make and appointment   Thanks

## 2021-06-09 DIAGNOSIS — E10.9 TYPE 1 DIABETES MELLITUS WITHOUT COMPLICATION (HCC): ICD-10-CM

## 2021-06-09 RX ORDER — PEN NEEDLE, DIABETIC 32GX 5/32"
NEEDLE, DISPOSABLE MISCELLANEOUS
Qty: 200 EACH | Refills: 0 | Status: SHIPPED | OUTPATIENT
Start: 2021-06-09 | End: 2021-09-27

## 2021-06-29 ENCOUNTER — OFFICE VISIT (OUTPATIENT)
Dept: ENDOCRINOLOGY | Facility: CLINIC | Age: 31
End: 2021-06-29
Payer: COMMERCIAL

## 2021-06-29 VITALS
BODY MASS INDEX: 22.92 KG/M2 | DIASTOLIC BLOOD PRESSURE: 100 MMHG | HEART RATE: 73 BPM | SYSTOLIC BLOOD PRESSURE: 180 MMHG | HEIGHT: 74 IN | WEIGHT: 178.6 LBS

## 2021-06-29 DIAGNOSIS — E10.649 TYPE 1 DIABETES MELLITUS WITH HYPOGLYCEMIA AND WITHOUT COMA (HCC): Primary | ICD-10-CM

## 2021-06-29 DIAGNOSIS — Z79.4 CURRENT USE OF INSULIN (HCC): ICD-10-CM

## 2021-06-29 DIAGNOSIS — I10 ESSENTIAL HYPERTENSION: ICD-10-CM

## 2021-06-29 LAB — SL AMB POCT HEMOGLOBIN AIC: 6.7 (ref ?–6.5)

## 2021-06-29 PROCEDURE — 1036F TOBACCO NON-USER: CPT | Performed by: NURSE PRACTITIONER

## 2021-06-29 PROCEDURE — 3044F HG A1C LEVEL LT 7.0%: CPT | Performed by: NURSE PRACTITIONER

## 2021-06-29 PROCEDURE — 4010F ACE/ARB THERAPY RXD/TAKEN: CPT | Performed by: NURSE PRACTITIONER

## 2021-06-29 PROCEDURE — 99214 OFFICE O/P EST MOD 30 MIN: CPT | Performed by: NURSE PRACTITIONER

## 2021-06-29 PROCEDURE — 83036 HEMOGLOBIN GLYCOSYLATED A1C: CPT | Performed by: NURSE PRACTITIONER

## 2021-06-29 RX ORDER — BLOOD-GLUCOSE TRANSMITTER
EACH MISCELLANEOUS
Qty: 3 EACH | Refills: 3 | Status: SHIPPED | OUTPATIENT
Start: 2021-06-29

## 2021-06-29 RX ORDER — BLOOD-GLUCOSE SENSOR
EACH MISCELLANEOUS
Qty: 3 EACH | Refills: 11 | Status: SHIPPED | OUTPATIENT
Start: 2021-06-29 | End: 2022-06-28

## 2021-06-29 RX ORDER — LISINOPRIL 5 MG/1
5 TABLET ORAL DAILY
Qty: 30 TABLET | Refills: 6 | Status: SHIPPED | OUTPATIENT
Start: 2021-06-29 | End: 2022-02-07

## 2021-06-29 NOTE — PROGRESS NOTES
Established Patient Progress Note      Chief Complaint   Patient presents with    Diabetes Type 1          History of Present Illness:   Alison Khoury is a 36 y o  male with a history of type 1 diabetes with long term use of insulin since August 2017  Reports no complications of diabetes  POC A1C 6 7  Average BG on meter 120-130s  He is interested in dexcom G6  Denies recent illness or hospitalizations  Denies recent severe hypoglycemic or severe hyperglycemic episodes  Denies any issues with his current regimen  Home glucose monitoring: are performed regularly 4x per day      Current regimen: Lantus 12-15 units and Novolog with a carb ratio of 1:17 (with activity) or 1:12 (without activity), ISF: 1:50 mg/dL, or 1:75 if more active, BG target 110  compliant all of the timedenies any side effects from current medications     Hypoglycemic episodes: Yes rare   H/o of hypoglycemia causing hospitalization or Intervention such as glucagon injection or ambulance call No   Hypoglycemia symptoms: dizziness, headache and sweating   Treatment of hypoglycemia: orange juice     Last Eye Exam: has upcoming f/u, no hx of retinopathy  Last Foot Exam: performed today       Patient Active Problem List   Diagnosis    Type 1 diabetes mellitus with hypoglycemia ([facility] [address] )    Routine general medical examination at a health care facility    Screening for cardiovascular condition    Sinus congestion    Elevated blood pressure reading without diagnosis of hypertension    Deviated septum    LUQ pain    Chest discomfort    Belching    Current use of insulin ([address] )    Essential hypertension      Past Medical History:   Diagnosis Date    Allergic     seasonal allergies    Diabetes mellitus ([address] ) 2015    Type 1    Essential hypertension 6/29/2021    Visual impairment     wears corrective lenses      Past Surgical History:   Procedure Laterality Date    NO PAST SURGERIES        Family History   Problem Relation Age of Onset    Arthritis Mother     Substance Abuse Mother     Hyperlipidemia Father     Substance Abuse Father     Anxiety disorder Brother      Social History     Tobacco Use    Smoking status: Never Smoker    Smokeless tobacco: Never Used   Substance Use Topics    Alcohol use: Yes     Comment: 3-4 beers on 1-2 nights per week     No Known Allergies      Current Outpatient Medications:     BD Pen Needle Estelle 2nd Gen 32G X 4 MM MISC, USE FOUR TIMES A DAY, Disp: 200 each, Rfl: 0    Glucagon (Baqsimi One Pack) 3 MG/DOSE POWD, Spray as directed for hypoglycemic emerency, Disp: 1 each, Rfl: 2    glucose blood (ONE TOUCH ULTRA TEST) test strip, USE TO TEST BLOOD SUGAR 8 TIMES A DAY, Disp: 200 each, Rfl: 0    insulin aspart (NovoLOG FlexPen) 100 UNIT/ML injection pen, Use 10units up to three times daily with carb couting, Disp: 15 mL, Rfl: 0    insulin glargine (Lantus SoloStar) 100 units/mL injection pen, Inject 15 Units under the skin daily, Disp: 5 pen, Rfl: 3    Continuous Blood Gluc Sensor (Dexcom G6 Sensor) MISC, Change every 10 days, Disp: 3 each, Rfl: 11    Continuous Blood Gluc Transmit (Dexcom G6 Transmitter) MISC, Change every 90 days, Disp: 3 each, Rfl: 3    lisinopril (ZESTRIL) 5 mg tablet, Take 1 tablet (5 mg total) by mouth daily, Disp: 30 tablet, Rfl: 6    Review of Systems   Constitutional: Negative for activity change, appetite change and fatigue  HENT: Negative for sore throat, trouble swallowing and voice change  Eyes: Negative for visual disturbance  Respiratory: Negative for choking, chest tightness and shortness of breath  Cardiovascular: Negative for chest pain, palpitations and leg swelling  Gastrointestinal: Negative for abdominal pain, constipation and diarrhea  Endocrine: Negative for cold intolerance, heat intolerance, polydipsia, polyphagia and polyuria  Genitourinary: Negative for frequency  Musculoskeletal: Negative for arthralgias and myalgias  Skin: Negative for rash  Neurological: Negative for dizziness and syncope  Hematological: Negative for adenopathy  Psychiatric/Behavioral: Negative for sleep disturbance  All other systems reviewed and are negative  Physical Exam:  Body mass index is 22 93 kg/m²  BP (!) 180/100 (BP Location: Left arm, Patient Position: Sitting, Cuff Size: Standard)   Pulse 73   Ht 6' 2" (1 88 m)   Wt 81 kg (178 lb 9 6 oz)   BMI 22 93 kg/m²    Wt Readings from Last 3 Encounters:   06/29/21 81 kg (178 lb 9 6 oz)   06/12/19 82 1 kg (181 lb)   05/29/19 81 2 kg (179 lb)       Physical Exam  Vitals reviewed  Constitutional:       General: He is not in acute distress  Appearance: He is well-developed  HENT:      Head: Normocephalic and atraumatic  Eyes:      Conjunctiva/sclera: Conjunctivae normal       Pupils: Pupils are equal, round, and reactive to light  Neck:      Thyroid: No thyromegaly  Cardiovascular:      Rate and Rhythm: Normal rate and regular rhythm  Pulses: no weak pulses          Dorsalis pedis pulses are 2+ on the right side and 2+ on the left side  Posterior tibial pulses are 2+ on the right side and 2+ on the left side  Heart sounds: Normal heart sounds  No murmur heard  Pulmonary:      Effort: Pulmonary effort is normal  No respiratory distress  Breath sounds: Normal breath sounds  No wheezing or rales  Abdominal:      General: Bowel sounds are normal  There is no distension  Palpations: Abdomen is soft  Tenderness: There is no abdominal tenderness  Musculoskeletal:         General: Normal range of motion  Cervical back: Normal range of motion and neck supple  Feet:      Right foot:      Skin integrity: No ulcer, skin breakdown, erythema, warmth, callus or dry skin  Left foot:      Skin integrity: No ulcer, skin breakdown, erythema, warmth, callus or dry skin  Lymphadenopathy:      Cervical: No cervical adenopathy  Skin:     General: Skin is warm and dry  Neurological:      Mental Status: He is alert and oriented to person, place, and time  Patient's shoes and socks removed  Right Foot/Ankle   Right Foot Inspection  Skin Exam: skin normal skin not intact, no dry skin, no warmth, no callus, no erythema, no maceration, no abnormal color, no pre-ulcer, no ulcer and no callus                            Sensory   Vibration: intact    Monofilament testing: intact  Vascular  Capillary refills: < 3 seconds  The right DP pulse is 2+  The right PT pulse is 2+  Left Foot/Ankle  Left Foot Inspection  Skin Exam: skin normalskin not intact, no dry skin, no warmth, no erythema, no maceration, normal color, no pre-ulcer, no ulcer and no callus                                         Sensory   Vibration: intact    Monofilament: intact  Vascular  Capillary refills: < 3 seconds  The left DP pulse is 2+  The left PT pulse is 2+  Assign Risk Category:  No deformity present; No loss of protective sensation; No weak pulses       Risk: 0      Labs:     Lab Results   Component Value Date    HGBA1C 6 7 (A) 06/29/2021       Lab Results   Component Value Date    SODIUM 142 12/11/2018    K 4 8 12/11/2018     12/11/2018    CO2 29 12/11/2018    AGAP 8 12/11/2018    BUN 16 12/11/2018    CREATININE 0 93 12/11/2018    GLUF 129 (H) 12/11/2018    CALCIUM 9 1 12/11/2018    AST 7 12/11/2018    ALT 18 12/11/2018    ALKPHOS 50 12/11/2018    TP 7 7 12/11/2018    TBILI 0 78 12/11/2018    EGFR 113 12/11/2018         Lab Results   Component Value Date    HDL 61 (H) 05/16/2018    TRIG 68 05/16/2018       Lab Results   Component Value Date    RZF1QCNFCHID 2 070 12/11/2018     Lab Results   Component Value Date    FREET4 1 05 12/11/2018       Impression & Plan:    Problem List Items Addressed This Visit        Endocrine    Type 1 diabetes mellitus with hypoglycemia ([address] ) - Primary     Overall well controlled on current regimen  Occasional dietary excursions   Would benefit from personal cgm (dexcom G6) to maintain control of his diabetes and make appropriate adjustment to insulin doses  He is on MDI and checks BG 4x per day  Script sent to [facility]  Relevant Medications    Continuous Blood Gluc Sensor (Dexcom G6 Sensor) MISC    Continuous Blood Gluc Transmit (Dexcom G6 Transmitter) MISC    Other Relevant Orders    POCT hemoglobin A1c (Completed)    Comprehensive metabolic panel    Lipid Panel with Direct LDL reflex    Microalbumin / creatinine urine ratio    Hemoglobin A1C    Comprehensive metabolic panel    Lipid Panel with Direct LDL reflex    T4, free    TSH, 3rd generation       Cardiovascular and Mediastinum    Essential hypertension     BP elevated today  Has been checking BG at home and typically has been running high  He has family history of HTN  Will start low dose of Lisinopril 5 mg daily  Check CMP in two weeks  Continue to monitor BP at home         Relevant Medications    lisinopril (ZESTRIL) 5 mg tablet       Other    Current use of insulin ([address] )          Orders Placed This Encounter   Procedures    Comprehensive metabolic panel     This is a patient instruction: Patient fasting for 8 hours or longer recommended   Lipid Panel with Direct LDL reflex     This is a patient instruction: This test requires patient fasting for 10-12 hours or longer  Drinking of black coffee or black tea is acceptable   Microalbumin / creatinine urine ratio    Hemoglobin A1C     Standing Status:   Future     Standing Expiration Date:   6/29/2022    Comprehensive metabolic panel     This is a patient instruction: Patient fasting for 8 hours or longer recommended  Standing Status:   Future     Standing Expiration Date:   6/29/2022    Lipid Panel with Direct LDL reflex     This is a patient instruction: This test requires patient fasting for 10-12 hours or longer  Drinking of black coffee or black tea is acceptable       Standing Status:   Future     Standing Expiration Date:   6/29/2022    T4, free     Standing Status:   Future     Standing Expiration Date:   9/29/2021    TSH, 3rd generation     This is a patient instruction: This test is non-fasting  Please drink two glasses of water morning of bloodwork  Standing Status:   Future     Standing Expiration Date:   9/29/2021    POCT hemoglobin A1c         Discussed with the patient and all questioned fully answered  He will call me if any problems arise  Follow-up appointment in 3 months       Counseled patient on diagnostic results, prognosis, risk and benefit of treatment options, instruction for management, importance of treatment compliance, Risk  factor reduction and impressions      Bro Dawkins [address]

## 2021-06-30 ENCOUNTER — TELEPHONE (OUTPATIENT)
Dept: ADMINISTRATIVE | Facility: OTHER | Age: 31
End: 2021-06-30

## 2021-06-30 NOTE — TELEPHONE ENCOUNTER
----- Message from Glennette Fabry sent at 6/29/2021  2:13 PM EDT -----  Regarding: DM eye exam  06/29/21 2:14 PM    Hello, our patient Chuy Motley has had Diabetic Eye Exam completed/performed  Please assist in updating the patient chart by making an External outreach to [facility] facility located in [place], [place]  The date of service is [id number] (pt doesn't remember what year)      Thank you,  Nyla Baez  [facility]

## 2021-06-30 NOTE — ASSESSMENT & PLAN NOTE
BP elevated today  Has been checking BG at home and typically has been running high  He has family history of HTN  Will start low dose of Lisinopril 5 mg daily  Check CMP in two weeks   Continue to monitor BP at home

## 2021-06-30 NOTE — ASSESSMENT & PLAN NOTE
Overall well controlled on current regimen  Occasional dietary excursions  Would benefit from personal cgm (dexcom G6) to maintain control of his diabetes and make appropriate adjustment to insulin doses  He is on MDI and checks BG 4x per day  [facility]

## 2021-07-02 NOTE — TELEPHONE ENCOUNTER
Upon review of the In Basket request and the patient's chart, initial outreach has been made via telephone call, please see Contacts section for details  I spoke with Rolf Kim        Thank you  Jignesh Machado MA

## 2021-07-02 NOTE — TELEPHONE ENCOUNTER
Upon review of the In Basket request we have found as a result of outreach that patient did not have the requested item(s) completed  Last visit 5/19/21, consult received , no DM eye exam done  Any additional questions or concerns should be emailed to the Practice Liaisons via [email or web address] email, please do not reply via In Basket      Thank you  Dennise Miner MA

## 2021-08-13 ENCOUNTER — TELEPHONE (OUTPATIENT)
Dept: ENDOCRINOLOGY | Facility: CLINIC | Age: 31
End: 2021-08-13

## 2021-08-13 DIAGNOSIS — E10.649 TYPE 1 DIABETES MELLITUS WITH HYPOGLYCEMIA AND WITHOUT COMA (HCC): ICD-10-CM

## 2021-08-13 RX ORDER — INSULIN ASPART 100 [IU]/ML
INJECTION, SOLUTION INTRAVENOUS; SUBCUTANEOUS
Qty: 15 ML | Refills: 3 | Status: SHIPPED | OUTPATIENT
Start: 2021-08-13 | End: 2021-09-27

## 2021-08-13 NOTE — TELEPHONE ENCOUNTER
Needs refill on novolog pen called into [facility]  [address] [facility]  p [phone]  Last seen 6- and fu 11-9-2021  Thank you     Asking for rx to be called in this am /  He is out of insulin and sugar is running 300    Thank you

## 2021-09-27 DIAGNOSIS — E10.9 TYPE 1 DIABETES MELLITUS WITHOUT COMPLICATION (HCC): ICD-10-CM

## 2021-09-27 RX ORDER — PEN NEEDLE, DIABETIC 32GX 5/32"
NEEDLE, DISPOSABLE MISCELLANEOUS
Qty: 200 EACH | Refills: 0 | Status: SHIPPED | OUTPATIENT
Start: 2021-09-27 | End: 2022-01-10

## 2021-11-26 ENCOUNTER — TELEPHONE (OUTPATIENT)
Dept: ENDOCRINOLOGY | Facility: CLINIC | Age: 31
End: 2021-11-26

## 2021-11-26 DIAGNOSIS — E10.649 TYPE 1 DIABETES MELLITUS WITH HYPOGLYCEMIA AND WITHOUT COMA (HCC): ICD-10-CM

## 2021-11-26 RX ORDER — INSULIN ASPART 100 [IU]/ML
INJECTION, SOLUTION INTRAVENOUS; SUBCUTANEOUS
Qty: 30 ML | Refills: 0 | Status: SHIPPED | OUTPATIENT
Start: 2021-11-26 | End: 2022-07-08 | Stop reason: SDUPTHER

## 2022-01-10 DIAGNOSIS — E10.9 TYPE 1 DIABETES MELLITUS WITHOUT COMPLICATION (HCC): ICD-10-CM

## 2022-01-10 RX ORDER — PEN NEEDLE, DIABETIC 32GX 5/32"
NEEDLE, DISPOSABLE MISCELLANEOUS
Qty: 200 EACH | Refills: 0 | Status: SHIPPED | OUTPATIENT
Start: 2022-01-10 | End: 2022-03-18

## 2022-01-25 ENCOUNTER — TELEPHONE (OUTPATIENT)
Dept: ENDOCRINOLOGY | Facility: CLINIC | Age: 32
End: 2022-01-25

## 2022-01-25 NOTE — TELEPHONE ENCOUNTER
We can change to [facility] 12units once daily  He has not been seen since 6/2021, and will need an appt with me or an NARAYAN   Thank you

## 2022-01-25 NOTE — TELEPHONE ENCOUNTER
Received a fax from [facility] Lantus Is not cover by his insurance, Alternatives are Frances Homme, Basaglar,levemir, Insulin Glargine, or Danisha   Pt notified

## 2022-01-26 DIAGNOSIS — E10.649 TYPE 1 DIABETES MELLITUS WITH HYPOGLYCEMIA AND WITHOUT COMA (HCC): Primary | ICD-10-CM

## 2022-01-26 RX ORDER — INSULIN DEGLUDEC INJECTION 100 U/ML
12 INJECTION, SOLUTION SUBCUTANEOUS DAILY
Qty: 15 ML | Refills: 1 | Status: SHIPPED | OUTPATIENT
Start: 2022-01-26

## 2022-01-26 RX ORDER — INSULIN DEGLUDEC INJECTION 100 U/ML
INJECTION, SOLUTION SUBCUTANEOUS
COMMUNITY
End: 2022-01-26 | Stop reason: SDUPTHER

## 2022-02-06 DIAGNOSIS — I10 ESSENTIAL HYPERTENSION: ICD-10-CM

## 2022-02-07 PROCEDURE — 4010F ACE/ARB THERAPY RXD/TAKEN: CPT

## 2022-02-07 RX ORDER — LISINOPRIL 5 MG/1
TABLET ORAL
Qty: 30 TABLET | Refills: 6 | Status: SHIPPED | OUTPATIENT
Start: 2022-02-07

## 2022-02-08 NOTE — PROGRESS NOTES
Established Patient Progress Note      Chief Complaint   Patient presents with    Diabetes Type 1        History of Present Illness:   Anuj Kyle is a 29 y o  male with type 1 diabetes with long term use of insulin since August 2017  Last seen in the office 6/29/2021  Reports complications of none  Last A1C was 6 7 6/2021  Denies recent illness or hospitalizations  Denies recent severe hypoglycemic or severe hyperglycemic episodes  Denies any issues with his current regimen  Home glucose monitoring: are performed regularly with CGM  Reports his diet is very good, less exercise in winter but still stays active  Current regimen:   Currently finishing Lantus 12-13 units, switching to [place] 12 units  Novolog with carb ratio of 1:17 (with activity) or 1:12 (without activity), ISF: 1:40 mg/dL, or 1:60 if more active, BG target 110    Anuj Kyle   Device used Dexcom  Home use   Indication: Type 1 Diabetes  More than 72 hours of data was reviewed  Report to be scanned to chart  Date Range: 1/27/22-2/9/22  Analysis of data:   Average Glucose: 132  SD : 37   Time in Target Range: 89%   Time Above Range: 10% high, <1% very high   Time Below Range: <1% low   Interpretation of data: BGs very tightly controlled  Some highs in ate morning 10-11AM Rare hypoglycemia       Last Eye Exam: 5/19/2021, UTD, no retinopathy   Last Foot Exam: 6/30/2021, UTD    Has hypertension: Taking lisinopril     Patient Active Problem List   Diagnosis    Type 1 diabetes mellitus with hypoglycemia ([email or web address] )    Routine general medical examination at a health care facility    Screening for cardiovascular condition    Sinus congestion    Elevated blood pressure reading without diagnosis of hypertension    Deviated septum    LUQ pain    Chest discomfort    Belching    Current use of insulin ([facility] [address] [facility]    Essential hypertension      Past Medical History:   Diagnosis Date    Allergic     seasonal allergies    Diabetes mellitus ([facility] [address] ) 2015    Type 1    Essential hypertension 6/29/2021    Visual impairment     wears corrective lenses      Past Surgical History:   Procedure Laterality Date    NO PAST SURGERIES        Family History   Problem Relation Age of Onset    Arthritis Mother     Substance Abuse Mother     Hyperlipidemia Father     Substance Abuse Father     Anxiety disorder Brother      Social History     Tobacco Use    Smoking status: Never Smoker    Smokeless tobacco: Never Used   Substance Use Topics    Alcohol use: Yes     Comment: 3-4 beers on 1-2 nights per week     No Known Allergies      Current Outpatient Medications:     BD Pen Needle Estelle 2nd Gen 32G X 4 MM MISC, USE FOUR TIMES A DAY, Disp: 200 each, Rfl: 0    Continuous Blood Gluc Sensor (Dexcom G6 Sensor) MISC, Change every 10 days, Disp: 3 each, Rfl: 11    Continuous Blood Gluc Transmit (Dexcom G6 Transmitter) MISC, Change every 90 days, Disp: 3 each, Rfl: 3    Glucagon (Baqsimi One Pack) 3 MG/DOSE POWD, Spray as directed for hypoglycemic emerency, Disp: 1 each, Rfl: 2    glucose blood (ONE TOUCH ULTRA TEST) test strip, USE TO TEST BLOOD SUGAR 8 TIMES A DAY, Disp: 200 each, Rfl: 0    insulin aspart (NovoLOG FlexPen) 100 UNIT/ML injection pen, INJECT 10 UNITS UNDER THE SKIN UP TO THREE TIMES A DAY WITH CARB COUNTING, Disp: 30 mL, Rfl: 0    lisinopril (ZESTRIL) 5 mg tablet, TAKE ONE TABLET BY MOUTH EVERY DAY, Disp: 30 tablet, Rfl: 6    Tresiba FlexTouch 100 units/mL injection pen, Inject 12 Units under the skin daily, Disp: 15 mL, Rfl: 1    Lantus SoloStar 100 units/mL injection pen, INJECT SUBCUTANEOUSLY 15 UNITS ONCE DAILY (Patient not taking: Reported on 2/9/2022), Disp: 15 mL, Rfl: 3    Review of Systems   Constitutional: Negative for activity change, appetite change, chills, fatigue, fever and unexpected weight change  HENT: Negative for sore throat, trouble swallowing and voice change  Eyes: Negative for visual disturbance     Respiratory: Negative for chest tightness and shortness of breath  Cardiovascular: Negative for chest pain, palpitations and leg swelling  Gastrointestinal: Negative for constipation, diarrhea, nausea and vomiting  Endocrine: Negative for cold intolerance, heat intolerance, polydipsia, polyphagia and polyuria  Genitourinary: Negative for frequency  Neurological: Negative for dizziness, tremors, weakness, light-headedness and numbness  All other systems reviewed and are negative  Physical Exam:  Body mass index is 23 06 kg/m²  /80   Pulse 75   Ht 6' 2" (1 88 m)   Wt 81 5 kg (179 lb 9 6 oz)   BMI 23 06 kg/m²    Wt Readings from Last 3 Encounters:   02/09/22 81 5 kg (179 lb 9 6 oz)   06/29/21 81 kg (178 lb 9 6 oz)   06/12/19 82 1 kg (181 lb)       Physical Exam  Vitals reviewed  Constitutional:       Appearance: Normal appearance  He is normal weight  HENT:      Head: Normocephalic  Eyes:      Extraocular Movements: Extraocular movements intact  Conjunctiva/sclera: Conjunctivae normal       Pupils: Pupils are equal, round, and reactive to light  Cardiovascular:      Rate and Rhythm: Normal rate and regular rhythm  Pulses: Normal pulses  Heart sounds: Normal heart sounds  No murmur heard  No friction rub  No gallop  Pulmonary:      Effort: Pulmonary effort is normal  No respiratory distress  Breath sounds: Normal breath sounds  No stridor  No wheezing, rhonchi or rales  Abdominal:      General: Bowel sounds are normal  There is no distension  Palpations: Abdomen is soft  Tenderness: There is no abdominal tenderness  Musculoskeletal:         General: No swelling, tenderness or signs of injury  Normal range of motion  Cervical back: Normal range of motion and neck supple  Right lower leg: No edema  Left lower leg: No edema  Skin:     General: Skin is warm and dry  Coloration: Skin is not jaundiced  Findings: No bruising or erythema  Neurological:      General: No focal deficit present  Mental Status: He is alert and oriented to person, place, and time  Cranial Nerves: No cranial nerve deficit  Sensory: No sensory deficit  Motor: No weakness  Coordination: Coordination normal       Gait: Gait normal    Psychiatric:         Mood and Affect: Mood normal          Behavior: Behavior normal          Thought Content: Thought content normal          Judgment: Judgment normal        Labs:   Lab Results   Component Value Date    HGBA1C 6 7 (A) 06/29/2021    HGBA1C 6 0 05/29/2019    HGBA1C 6 1 05/16/2018     Lab Results   Component Value Date    CREATININE 0 93 12/11/2018    CREATININE 0 93 09/04/2018    CREATININE 0 95 05/16/2018    BUN 16 12/11/2018    K 4 8 12/11/2018     12/11/2018    CO2 29 12/11/2018     eGFR   Date Value Ref Range Status   12/11/2018 113 ml/min/1 73sq m Final     Lab Results   Component Value Date    HDL 61 (H) 05/16/2018    TRIG 68 05/16/2018     Lab Results   Component Value Date    ALT 18 12/11/2018    AST 7 12/11/2018    ALKPHOS 50 12/11/2018     Lab Results   Component Value Date    GHU2NTVOEYDJ 2 070 12/11/2018     Lab Results   Component Value Date    FREET4 1 05 12/11/2018       Impression & Plan:    Problem List Items Addressed This Visit        Endocrine    Type 1 diabetes mellitus with hypoglycemia ([facility] [address] ) - Primary     BGs very well controlled  Rare hypoglycemia  Continue current medication regimen  Not interested in pump at this time, would like to continue with injections  Reviewed symptoms of hypoglycemia  He keeps glucose tablets with him at all times and has Glucagon nasal powder in case of emergency at home  Check A1C, CMP, microalbumin, and lipid panel     Lab Results   Component Value Date    HGBA1C 6 7 (A) 06/29/2021            Relevant Orders    Comprehensive metabolic panel Lab Collect    HEMOGLOBIN A1C W/ EAG ESTIMATION Lab Collect    Microalbumin / creatinine urine ratio Lab Collect    Lipid panel Lab Collect Lab Collect       Cardiovascular and Mediastinum    Essential hypertension     BP at goal today  Continue lisinopril  Has BP cuff at home to continue to monitor  Other    Current use of insulin ([facility]CC)     Continue medication regimen  Orders Placed This Encounter   Procedures    Comprehensive metabolic panel Lab Collect     This is a patient instruction: Patient fasting for 8 hours or longer recommended  Standing Status:   Future     Standing Expiration Date:   2/9/2023    HEMOGLOBIN A1C W/ EAG ESTIMATION Lab Collect     Standing Status:   Future     Standing Expiration Date:   2/9/2023    Microalbumin / creatinine urine ratio Lab Collect     Standing Status:   Future     Standing Expiration Date:   2/9/2023    Lipid panel Lab Collect Lab Collect     This is a patient instruction: This test requires patient fasting for 10-12 hours or longer  Drinking of black coffee or black tea is acceptable  Standing Status:   Future     Standing Expiration Date:   2/9/2023       There are no Patient Instructions on file for this visit  Discussed with the patient and all questioned fully answered  He will call me if any problems arise      MATT Lua

## 2022-02-09 ENCOUNTER — OFFICE VISIT (OUTPATIENT)
Dept: ENDOCRINOLOGY | Facility: CLINIC | Age: 32
End: 2022-02-09
Payer: COMMERCIAL

## 2022-02-09 VITALS
DIASTOLIC BLOOD PRESSURE: 80 MMHG | SYSTOLIC BLOOD PRESSURE: 130 MMHG | HEIGHT: 74 IN | HEART RATE: 75 BPM | BODY MASS INDEX: 23.05 KG/M2 | WEIGHT: 179.6 LBS

## 2022-02-09 DIAGNOSIS — Z79.4 CURRENT USE OF INSULIN (HCC): ICD-10-CM

## 2022-02-09 DIAGNOSIS — E10.649 TYPE 1 DIABETES MELLITUS WITH HYPOGLYCEMIA AND WITHOUT COMA (HCC): Primary | ICD-10-CM

## 2022-02-09 DIAGNOSIS — I10 ESSENTIAL HYPERTENSION: ICD-10-CM

## 2022-02-09 PROCEDURE — 1036F TOBACCO NON-USER: CPT

## 2022-02-09 PROCEDURE — 99214 OFFICE O/P EST MOD 30 MIN: CPT

## 2022-02-09 PROCEDURE — 3075F SYST BP GE 130 - 139MM HG: CPT

## 2022-02-09 PROCEDURE — 3008F BODY MASS INDEX DOCD: CPT

## 2022-02-09 PROCEDURE — 3079F DIAST BP 80-89 MM HG: CPT

## 2022-02-09 PROCEDURE — 95251 CONT GLUC MNTR ANALYSIS I&R: CPT

## 2022-02-09 NOTE — ASSESSMENT & PLAN NOTE
BGs very well controlled  Rare hypoglycemia  Continue current medication regimen  Not interested in pump at this time, would like to continue with injections  Reviewed symptoms of hypoglycemia  He keeps glucose tablets with him at all times and has Glucagon nasal powder in case of emergency at home  Check A1C, CMP, microalbumin, and lipid panel     Lab Results   Component Value Date    HGBA1C 6 7 (A) 06/29/2021

## 2022-03-18 DIAGNOSIS — E10.9 TYPE 1 DIABETES MELLITUS WITHOUT COMPLICATION (HCC): ICD-10-CM

## 2022-03-18 RX ORDER — PEN NEEDLE, DIABETIC 32GX 5/32"
NEEDLE, DISPOSABLE MISCELLANEOUS
Qty: 200 EACH | Refills: 0 | Status: SHIPPED | OUTPATIENT
Start: 2022-03-18 | End: 2022-07-08

## 2022-05-31 ENCOUNTER — OFFICE VISIT (OUTPATIENT)
Dept: FAMILY MEDICINE CLINIC | Facility: CLINIC | Age: 32
End: 2022-05-31
Payer: COMMERCIAL

## 2022-05-31 VITALS
SYSTOLIC BLOOD PRESSURE: 134 MMHG | OXYGEN SATURATION: 100 % | BODY MASS INDEX: 22.88 KG/M2 | WEIGHT: 184 LBS | RESPIRATION RATE: 16 BRPM | DIASTOLIC BLOOD PRESSURE: 90 MMHG | HEART RATE: 66 BPM | HEIGHT: 75 IN | TEMPERATURE: 97 F

## 2022-05-31 DIAGNOSIS — Z23 ENCOUNTER FOR IMMUNIZATION: ICD-10-CM

## 2022-05-31 DIAGNOSIS — Z00.00 ANNUAL PHYSICAL EXAM: Primary | ICD-10-CM

## 2022-05-31 DIAGNOSIS — L98.9 NON-HEALING SKIN LESION: ICD-10-CM

## 2022-05-31 DIAGNOSIS — E10.649 TYPE 1 DIABETES MELLITUS WITH HYPOGLYCEMIA AND WITHOUT COMA (HCC): ICD-10-CM

## 2022-05-31 PROCEDURE — 3080F DIAST BP >= 90 MM HG: CPT | Performed by: NURSE PRACTITIONER

## 2022-05-31 PROCEDURE — 4004F PT TOBACCO SCREEN RCVD TLK: CPT | Performed by: NURSE PRACTITIONER

## 2022-05-31 PROCEDURE — 99395 PREV VISIT EST AGE 18-39: CPT | Performed by: NURSE PRACTITIONER

## 2022-05-31 PROCEDURE — 3725F SCREEN DEPRESSION PERFORMED: CPT | Performed by: NURSE PRACTITIONER

## 2022-05-31 PROCEDURE — 90471 IMMUNIZATION ADMIN: CPT

## 2022-05-31 PROCEDURE — 90715 TDAP VACCINE 7 YRS/> IM: CPT

## 2022-05-31 PROCEDURE — 3075F SYST BP GE 130 - 139MM HG: CPT | Performed by: NURSE PRACTITIONER

## 2022-05-31 PROCEDURE — 3008F BODY MASS INDEX DOCD: CPT | Performed by: NURSE PRACTITIONER

## 2022-05-31 NOTE — PATIENT INSTRUCTIONS

## 2022-05-31 NOTE — ASSESSMENT & PLAN NOTE
Well controlled ith insulin, managed by endocrinology  Foot exam normal, utd with eye exams  Pt is due for labs, orders already in for his upcoming endo f/u  Continue with current management and f/u    Lab Results   Component Value Date    HGBA1C 6 7 (A) 06/29/2021

## 2022-05-31 NOTE — ASSESSMENT & PLAN NOTE
Non-pigmented lesion left shoulder x 1 year with patter of bleeding/scabbing, healing, and recurrence  Referral to derm for further evaluation

## 2022-05-31 NOTE — ASSESSMENT & PLAN NOTE
Unremarkable exam of adult male  Tdap administered today  Pt should continue with healthy diet, regular exercise as he is a healthy BMI  Continue regular dental and eye exams

## 2022-05-31 NOTE — PROGRESS NOTES
[address]    NAME: Marcella Keller  AGE: 29 y o  SEX: male  : 1990     DATE: 2022     Assessment and Plan:     Problem List Items Addressed This Visit        Endocrine    Type 1 diabetes mellitus with hypoglycemia ([address] )     Well controlled ith insulin, managed by endocrinology  Foot exam normal, utd with eye exams  Pt is due for labs, orders already in for his upcoming endo f/u  Continue with current management and f/u  Lab Results   Component Value Date    HGBA1C 6 7 (A) 2021                 Musculoskeletal and Integument    Non-healing skin lesion     Non-pigmented lesion left shoulder x 1 year with patter of bleeding/scabbing, healing, and recurrence  Referral to derm for further evaluation  Relevant Orders    Ambulatory Referral to Dermatology       Other    Annual physical exam - Primary     Unremarkable exam of adult male  Tdap administered today  Pt should continue with healthy diet, regular exercise as he is a healthy BMI  Continue regular dental and eye exams  Other Visit Diagnoses     Encounter for immunization        Relevant Orders    TDAP VACCINE GREATER THAN OR EQUAL TO 8YO IM          Immunizations and preventive care screenings were discussed with patient today  Appropriate education was printed on patient's after visit summary  Counseling:  Dental Health: discussed importance of regular tooth brushing, flossing, and dental visits  Injury prevention: discussed safety/seat belts, safety helmets, smoke detectors, carbon dioxide detectors, and smoking near bedding or upholstery  Exercise: the importance of regular exercise/physical activity was discussed  Recommend exercise 3-5 times per week for at least 30 minutes  Depression Screening and Follow-up Plan: Patient was screened for depression during today's encounter   They screened negative with a PHQ-2 score of 0     Tobacco Cessation Counseling: Tobacco cessation counseling was provided  The patient is sincerely urged to quit consumption of tobacco  He is ready to quit tobacco  Medication options not discussed  Return in about 1 year (around 5/31/2023) for Annual physical      Chief Complaint:     Chief Complaint   Patient presents with    Physical Exam      History of Present Illness:     Adult Annual Physical   Patient here for a comprehensive physical exam  The patient reports no problems  Diet and Physical Activity  Diet/Nutrition: well balanced diet, low carb diet, consuming 3-5 servings of fruits/vegetables daily, adequate fiber intake and adequate whole grain intake  Exercise: vigorous cardiovascular exercise and 3-4 times a week on average  Depression Screening  PHQ-2/9 Depression Screening    Little interest or pleasure in doing things: 0 - not at all  Feeling down, depressed, or hopeless: 0 - not at all  PHQ-2 Score: 0  PHQ-2 Interpretation: Negative depression screen       General Health  Sleep: sleeps well and gets 7-8 hours of sleep on average  Hearing: normal - bilateral   Vision: no vision problems, goes for regular eye exams, most recent eye exam <1 year ago and wears glasses and contacts  Dental: regular dental visits and brushes teeth twice daily   Health  History of STDs?: no      Review of Systems:     Review of Systems   Constitutional: Negative for activity change, appetite change, chills, diaphoresis, fatigue, fever and unexpected weight change  HENT: Negative for hearing loss  Eyes: Negative for visual disturbance  Respiratory: Negative for cough, chest tightness and shortness of breath  Cardiovascular: Negative for chest pain, palpitations and leg swelling  Gastrointestinal: Negative for abdominal pain, constipation, diarrhea, nausea and vomiting  Genitourinary: Negative for difficulty urinating, dysuria and frequency     Musculoskeletal: Negative for arthralgias and myalgias  Allergic/Immunologic: Negative for environmental allergies  Neurological: Negative for dizziness, weakness, light-headedness, numbness and headaches  Psychiatric/Behavioral: Negative for dysphoric mood, self-injury, sleep disturbance and suicidal ideas  The patient is not nervous/anxious  Past Medical History:     Past Medical History:   Diagnosis Date    Allergic     seasonal allergies    Diabetes mellitus ([facility] [address] ) 2015    Type 1    Essential hypertension 6/29/2021    Visual impairment     wears corrective lenses      Past Surgical History:     Past Surgical History:   Procedure Laterality Date    NO PAST SURGERIES        Social History:     Social History     Socioeconomic History    Marital status: /Civil Union     Spouse name: None    Number of children: None    Years of education: None    Highest education level: None   Occupational History    None   Tobacco Use    Smoking status: Current Some Day Smoker     Types: Cigars    Smokeless tobacco: Never Used   Vaping Use    Vaping Use: Never used   Substance and Sexual Activity    Alcohol use: Yes     Comment: 3-4 beers on 1-2 nights per week    Drug use: Never    Sexual activity: Yes     Partners: Female   Other Topics Concern    None   Social History Narrative    Lives with his wife    Works full time, as a       Caffeine consumption is daily coffee 1-2 cups     Social Determinants of Health     Financial Resource Strain: Not on file   Food Insecurity: Not on file   Transportation Needs: Not on file   Physical Activity: Not on file   Stress: Not on file   Social Connections: Not on file   Intimate Partner Violence: Not on file   Housing Stability: Not on file      Family History:     Family History   Problem Relation Age of Onset    Diabetes Mother     Arthritis Mother     Substance Abuse Mother     Hyperlipidemia Father     Substance Abuse Father     Anxiety disorder Brother Current Medications:     Current Outpatient Medications   Medication Sig Dispense Refill    BD Pen Needle Estelle 2nd Gen 32G X 4 MM MISC USE FOUR TIMES A  each 0    Continuous Blood Gluc Sensor (Dexcom G6 Sensor) MISC Change every 10 days 3 each 11    Continuous Blood Gluc Transmit (Dexcom G6 Transmitter) MISC Change every 90 days 3 each 3    Glucagon (Baqsimi One Pack) 3 MG/DOSE POWD Spray as directed for hypoglycemic emerency 1 each 2    glucose blood (ONE TOUCH ULTRA TEST) test strip USE TO TEST BLOOD SUGAR 8 TIMES A  each 0    insulin aspart (NovoLOG FlexPen) 100 UNIT/ML injection pen INJECT 10 UNITS UNDER THE SKIN UP TO THREE TIMES A DAY WITH CARB COUNTING 30 mL 0    lisinopril (ZESTRIL) 5 mg tablet TAKE ONE TABLET BY MOUTH EVERY DAY 30 tablet 6    Tresiba FlexTouch 100 units/mL injection pen Inject 12 Units under the skin daily 15 mL 1    Lantus SoloStar 100 units/mL injection pen INJECT SUBCUTANEOUSLY 15 UNITS ONCE DAILY (Patient not taking: No sig reported) 15 mL 3     No current facility-administered medications for this visit  Allergies:     No Known Allergies   Physical Exam:     /90 (BP Location: Left arm)   Pulse 66   Temp (!) 97 °F (36 1 °C)   Resp 16   Ht 6' 2 84" (1 901 m)   Wt 83 5 kg (184 lb)   SpO2 100%   BMI 23 10 kg/m²     Physical Exam  Vitals reviewed  Constitutional:       General: He is awake  He is not in acute distress  Appearance: Normal appearance  He is well-developed and well-groomed  He is not ill-appearing  HENT:      Head: Normocephalic and atraumatic  Right Ear: Hearing, tympanic membrane, ear canal and external ear normal       Left Ear: Hearing, tympanic membrane, ear canal and external ear normal       Nose: Nose normal       Mouth/Throat:      Lips: Pink  Mouth: Mucous membranes are moist       Pharynx: Oropharynx is clear     Eyes:      General: Lids are normal       Conjunctiva/sclera: Conjunctivae normal  Pupils: Pupils are equal, round, and reactive to light  Neck:      Thyroid: No thyromegaly  Vascular: Normal carotid pulses  No carotid bruit  Cardiovascular:      Rate and Rhythm: Normal rate and regular rhythm  Pulses: no weak pulses          Dorsalis pedis pulses are 2+ on the right side and 2+ on the left side  Heart sounds: Normal heart sounds  No murmur heard  Pulmonary:      Effort: Pulmonary effort is normal       Breath sounds: Normal breath sounds  Abdominal:      General: Abdomen is flat  Bowel sounds are normal       Palpations: Abdomen is soft  Tenderness: There is no abdominal tenderness  Musculoskeletal:         General: Normal range of motion  Cervical back: Normal range of motion  Right lower leg: No edema  Left lower leg: No edema  Feet:      Right foot:      Skin integrity: No ulcer, skin breakdown, erythema, warmth, callus or dry skin  Left foot:      Skin integrity: No ulcer, skin breakdown, erythema, warmth, callus or dry skin  Skin:     General: Skin is warm and dry  Capillary Refill: Capillary refill takes less than 2 seconds  Findings: Lesion (left shoulder flesh colored papule with adjacent rough papule with peeling skin noted,) present  Neurological:      Mental Status: He is alert and oriented to person, place, and time  Motor: Motor function is intact  Deep Tendon Reflexes: Reflexes are normal and symmetric  Psychiatric:         Attention and Perception: Attention normal          Mood and Affect: Mood normal          Speech: Speech normal          Behavior: Behavior normal  Behavior is cooperative  Thought Content: Thought content normal          Cognition and Memory: Cognition normal          Judgment: Judgment normal       Patient's shoes and socks removed  Right Foot/Ankle   Right Foot Inspection  Skin Exam: skin normal and skin intact   No dry skin, no warmth, no callus, no erythema, no maceration, no abnormal color, no pre-ulcer, no ulcer and no callus  Toe Exam: ROM and strength within normal limits  Sensory   Monofilament testing: intact    Vascular  Capillary refills: < 3 seconds  The right DP pulse is 2+  Left Foot/Ankle  Left Foot Inspection  Skin Exam: skin normal and skin intact  No dry skin, no warmth, no erythema, no maceration, normal color, no pre-ulcer, no ulcer and no callus  Toe Exam: ROM and strength within normal limits  Sensory   Monofilament testing: intact    Vascular  Capillary refills: < 3 seconds  The left DP pulse is 2+       Assign Risk Category  No deformity present  No loss of protective sensation  No weak pulses  Risk: 0        Alyse Lowry, [address]  [facility]

## 2022-06-02 ENCOUNTER — TELEPHONE (OUTPATIENT)
Dept: DERMATOLOGY | Facility: CLINIC | Age: 32
End: 2022-06-02

## 2022-06-28 DIAGNOSIS — E10.649 TYPE 1 DIABETES MELLITUS WITH HYPOGLYCEMIA AND WITHOUT COMA (HCC): ICD-10-CM

## 2022-06-28 RX ORDER — BLOOD-GLUCOSE SENSOR
EACH MISCELLANEOUS
Qty: 3 EACH | Refills: 11 | Status: SHIPPED | OUTPATIENT
Start: 2022-06-28

## 2022-07-08 ENCOUNTER — TELEPHONE (OUTPATIENT)
Dept: ENDOCRINOLOGY | Facility: CLINIC | Age: 32
End: 2022-07-08

## 2022-07-08 DIAGNOSIS — E10.9 TYPE 1 DIABETES MELLITUS WITHOUT COMPLICATION (HCC): ICD-10-CM

## 2022-07-08 DIAGNOSIS — E10.649 TYPE 1 DIABETES MELLITUS WITH HYPOGLYCEMIA AND WITHOUT COMA (HCC): ICD-10-CM

## 2022-07-08 RX ORDER — PEN NEEDLE, DIABETIC 32GX 5/32"
NEEDLE, DISPOSABLE MISCELLANEOUS
Qty: 200 EACH | Refills: 0 | Status: SHIPPED | OUTPATIENT
Start: 2022-07-08 | End: 2022-10-26

## 2022-07-08 RX ORDER — INSULIN ASPART 100 [IU]/ML
INJECTION, SOLUTION INTRAVENOUS; SUBCUTANEOUS
Qty: 30 ML | Refills: 1 | Status: SHIPPED | OUTPATIENT
Start: 2022-07-08

## 2022-08-17 ENCOUNTER — CONSULT (OUTPATIENT)
Dept: DERMATOLOGY | Facility: CLINIC | Age: 32
End: 2022-08-17
Payer: COMMERCIAL

## 2022-08-17 VITALS — HEIGHT: 74 IN | WEIGHT: 185 LBS | TEMPERATURE: 97.2 F | BODY MASS INDEX: 23.74 KG/M2

## 2022-08-17 DIAGNOSIS — L98.9 NON-HEALING SKIN LESION: ICD-10-CM

## 2022-08-17 PROCEDURE — 99203 OFFICE O/P NEW LOW 30 MIN: CPT | Performed by: DERMATOLOGY

## 2022-08-17 NOTE — PROGRESS NOTES
[facility] Note     Patient Name: Ml Carter  Encounter Date: 08/17/22     Have you been cared for by a [facility] in the last 3 years and, if so, which one? No    · Have you traveled outside of the [address] [place] [address] in the past 3 months or outside of the [facility] area in the last 2 weeks? No     May we call your Preferred Phone number to discuss your specific medical information? Yes     May we leave a detailed message that includes your specific medical information? Yes      Today's Chief Concerns:   Concern #1:  Spot on left shoulder    Concern #2:  Discoloration on right forehead    Past Medical History:  Have you personally ever had or currently have any of the following? · Skin cancer (such as Melanoma, Basal Cell Carcinoma, Squamous Cell Carcinoma? (If Yes, please provide more detail)- No  · Eczema: No  · Psoriasis: No  · HIV/AIDS: No  · Hepatitis B or C: No  · Tuberculosis: no  · Systemic Immunosuppression such as Diabetes, Biologic or Immunotherapy, Chemotherapy, Organ Transplantation, Bone Marrow Transplantation (If YES, please provide more detail): YES, diabetes  · Radiation Treatment (If YES, please provide more detail): No  · Any other major medical conditions/concerns? (If Yes, which types)- No    Social History:     What is/was your primary occupation?  What are your hobbies/past-times? Family History:  Have any of your "first degree relatives" (parent, brother, sister, or child) had any of the following       · Skin cancer such as Melanoma or Merkel Cell Carcinoma or Pancreatic Cancer? No  · Eczema, Asthma, Hay Fever or Seasonal Allergies: No  · Psoriasis or Psoriatic Arthritis: No  · Do any other medical conditions seem to run in your family? If Yes, what condition and which relatives?   YES, mother has hx of diabetes; father has hx of hyperlipidemia    Current Medications:         Current Outpatient Medications:   BD Pen Needle Estelle 2nd Gen 32G X 4 MM MISC, USE ONE PEN NEEDLE FOUR TIMES A DAY, Disp: 200 each, Rfl: 0    Continuous Blood Gluc Sensor (Dexcom G6 Sensor) MISC, CHANGE EVERY 10 DAYS, Disp: 3 each, Rfl: 11    Continuous Blood Gluc Transmit (Dexcom G6 Transmitter) MISC, Change every 90 days, Disp: 3 each, Rfl: 3    Glucagon (Baqsimi One Pack) 3 MG/DOSE POWD, Spray as directed for hypoglycemic emerency, Disp: 1 each, Rfl: 2    glucose blood (ONE TOUCH ULTRA TEST) test strip, USE TO TEST BLOOD SUGAR 8 TIMES A DAY, Disp: 200 each, Rfl: 0    insulin aspart (NovoLOG FlexPen) 100 UNIT/ML injection pen, INJECT 10 UNITS UNDER THE SKIN UP TO THREE TIMES A DAY WITH CARB COUNTING, Disp: 30 mL, Rfl: 1    lisinopril (ZESTRIL) 5 mg tablet, TAKE ONE TABLET BY MOUTH EVERY DAY, Disp: 30 tablet, Rfl: 6    Tresiba FlexTouch 100 units/mL injection pen, Inject 12 Units under the skin daily, Disp: 15 mL, Rfl: 1    Lantus SoloStar 100 units/mL injection pen, INJECT SUBCUTANEOUSLY 15 UNITS ONCE DAILY (Patient not taking: No sig reported), Disp: 15 mL, Rfl: 3      Review of Systems:  Have you recently had or currently have any of the following? If YES, what are you doing for the problem? · Fever, chills or unintended weight loss: No  · Sudden loss or change in your vision: No  · Nausea, vomiting or blood in your stool: No  · Painful or swollen joints: No  · Wheezing or cough: No  · Changing mole or non-healing wound: No  · Nosebleeds: No  · Excessive sweating: No  · Easy or prolonged bleeding? No  · Over the last 2 weeks, how often have you been bothered by the following problems? · Taking little interest or pleasure in doing things: 1 - Not at All  · Feeling down, depressed, or hopeless: 1 - Not at All  Rapid heartbeat with epinephrine:  No    · Any known allergies?       · No Known Allergies      Physical Exam:     Was a chaperone (Derm Clinical Assistant) present throughout the entire Physical Exam? Yes     Did the Dermatology Team specifically  the patient on the importance of a Full Skin Exam to be sure that nothing is missed clinically? Yes}  o Did the patient ultimately request or accept a Full Skin Exam?  NO  o Did the patient specifically refuse to have the areas "under-the-bra" examined by the Dermatologist? No  o Did the patient specifically refuse to have the areas "under-the-underwear" examined by the Dermatologist? No    CONSTITUTIONAL:   Vitals:    08/17/22 0816   Temp: (!) 97 2 °F (36 2 °C)   TempSrc: Temporal   Weight: 83 9 kg (185 lb)   Height: 6' 2" (1 88 m)       PSYCH: Normal mood and affect  EYES: Normal conjunctiva  ENT: Normal lips and oral mucosa  CARDIOVASCULAR: No edema  RESPIRATORY: Normal respirations  HEME/LYMPH/IMMUNO:  No regional lymphadenopathy except as noted below in "ASSESSMENT AND PLAN BY DIAGNOSIS"       Assessment and Plan by Diagnosis:    History of Present Condition:     Duration:  How long has this been an issue for you?    o  shoulder- about 2 years; forehead-about 6 months   Location Affected:  Where on the body is this affecting you?    o  left shoulder and right forehead   Quality:  Is there any bleeding, pain, itch, burning/irritation, or redness associated with the skin lesion? o  denies   Severity:  Describe any bleeding, pain, itch, burning/irritation, or redness on a scale of 1 to 10 (with 10 being the worst)  o  0   Timing:  Does this condition seem to be there pretty constantly or do you notice it more at specific times throughout the day?     o  constant   Context:  Have you ever noticed that this condition seems to be associated with specific activities you do?    o  denies   Modifying Factors:    o Anything that seems to make the condition worse?    -  scratching  o What have you tried to do to make the condition better?    -  denies   Associated Signs and Symptoms:  Does this skin lesion seem to be associated with any of the following:  o  SL AMB DERM SIGNS AND SYMPTOMS: Itching and Scratching     HYPERPIGMENTATION OF SUN  Physical Exam:   Anatomic Location Affected:  Right forehead   Morphological Description:  See photo   Pertinent Positives:   Pertinent Negatives: Additional History of Present Condition:  Present for about 6 months    Assessment and Plan:  Based on a thorough discussion of this condition and the management approach to it (including a comprehensive discussion of the known risks, side effects and potential benefits of treatment), the patient (family) agrees to implement the following specific plan:   Consistent with early solar-lentigo formation, with typical features noted on dermoscopy   Reassured benign                LENTIGO/START OF SEBORHEIC KERATOSIS    Physical Exam:   Anatomic Location Affected:  Left shoulder   Morphological Description:  See photo  Site WNL   Pertinent Positives:   Pertinent Negatives: Additional History of Present Condition:  Present for about 2 years    Assessment and Plan:  Based on a thorough discussion of this condition and the management approach to it (including a comprehensive discussion of the known risks, side effects and potential benefits of treatment), the patient (family) agrees to implement the following specific plan:   Reassured benign    What is a lentigo? A lentigo is a pigmented flat or slightly raised lesion with a clearly defined edge  Unlike an ephelis (freckle), it does not fade in the winter months  There are several kinds of lentigo  The name lentigo originally referred to its appearance resembling a small lentil  The plural of lentigo is lentigines, although lentigos is also in common use  Who gets lentigines? Lentigines can affect males and females of all ages and races  Solar lentigines are especially prevalent in fair skinned adults  Lentigines associated with syndromes are present at birth or arise during childhood  What causes lentigines?   Common forms of lentigo are due to exposure to ultraviolet radiation:   Sun damage including sunburn    Indoor tanning    Phototherapy, especially photochemotherapy (PUVA)    Ionizing radiation, eg radiation therapy, can also cause lentigines  Several familial syndromes associated with widespread lentigines originate from mutations in Mynor-MAP kinase, mTOR signaling and PTEN pathways  What are the clinical features of lentigines? Lentigines have been classified into several different types depending on what they look like, where they appear on the body, causative factors, and whether they are associated to other diseases or conditions  Lentigines may be solitary or more often, multiple  Most lentigines are smaller than 5 mm in diameter      Lentigo simplex   A precursor to junctional naevus    Arises during childhood and early adult life    Found on trunk and limbs    Small brown round or oval macule or thin plaque    Jagged or smooth edge    May have a dry surface    May disappear in time  Solar lentigo   A precursor to seborrhoeic keratosis    Found on chronically sun exposed sites such as hands, face, lower legs    May also follow sunburn to shoulders    Yellow, light or dark brown regular or irregular macule or thin plaque    May have a dry surface    Often has moth-eaten outline    Can slowly enlarge to several centimeters in diameter    May disappear, often through the process known as lichenoid keratosis    When atypical in appearance, may be difficult to distinguish from melanoma in situ  Ink spot lentigo   Also known as reticulated lentigo    Few in number compared to solar lentigines    Follows sunburn in very fair skinned individuals    Dark brown to black irregular ink spot-like macule  PUVA lentigo   Similar to ink spot lentigo but follows photochemotherapy (PUVA)    Location anywhere exposed to PUVA  Tanning bed lentigo   Similar to ink spot lentigo but follows indoor tanning    Location anywhere exposed to tanning bed  Radiation lentigo   Occurs in site of irradiation (accidental or therapeutic)    Associated with late-stage radiation dermatitis: epidermal atrophy, subcutaneous fibrosis, keratosis, telangiectasias  Melanotic macule   Mucosal surfaces or adjacent glabrous skin eg lip, vulva, penis, anus    Light to dark brown    Also called mucosal melanosis  Generalised lentigines   Found on any exposed or covered site from early childhood    Small macules may merge to form larger patches    Not associated with a syndrome    Also called lentigines profusa, multiple lentigines  Agminated lentigines   Naevoid eruption of lentigos confined to a single segmental area    Sharp demarcation in midline    May have associated neurological and developmental abnormalities  Patterned lentigines   Inherited tendency to lentigines on face, lips, buttocks, palms, soles    Recognised mainly in people of  ethnicity  Centrofacial neurodysraphic lentiginosis  Associated with mental retardation  Lentiginosis syndromes   Syndromes include LEOPARD/[place], Peutz-Jeghers, Laugier-Hunziker, Moynahan, Xeroderma pigmentosum, myxoma syndromes (HERRON, NAME, Rosa), Ruvalcaba-Myhre-Neff, Bannayan-Zonnana syndrome, Cowden disease (multiple hamartoma syndrome )    Inheritance is autosomal dominant; sporadic cases common    Widespread lentigines present at birth or arise in early childhood    Associated with neural, endocrine, and mesenchymal tumors    How is the diagnosis made? Lentigines are usually diagnosed clinically by their typical appearance   Concern regarding possibility of melanoma may lead to:   Dermatoscopy    Diagnostic excision biopsy    Histopathology of a lentigo shows:   Thickened epidermis    An increased number of melanocytes along the basal layer of epidermis    Unlike junctional melanocytic naevus, there are no nests of melanocytes    Increased melanin pigment within the keratinocytes    Additional features depending on type of lentigo    In contrast, an ephelis (freckle) shows sun-induced increased melanin within the keratinocytes, without an increase in number of cells  What is the treatment for lentigines? Most lentigines are left alone  Attempts to lighten them may not be successful  The following approaches are used:   SPF 50+ broad-spectrum sunscreen    Hydroquinone bleaching cream    Alpha hydroxy acids    Vitamin C    Retinoids    Azelaic acid    Chemical peels  Individual lesions can be permanently removed using:   Cryotherapy    Intense pulsed light    Pigment lasers    How can lentigines be prevented? Lentigines associated with exposure ultraviolet radiation can be prevented by very careful sun protection  Clothing is more successful at preventing new lentigines than are sunscreens  What is the outlook for lentigines? Lentigines usually persist  They may increase in number with age and sun exposure  Some in sun-protected sites may fade and disappear  XEROSIS ("DRY SKIN")    Physical Exam:   Anatomic Location Affected:  neck   Morphological Description:  Clear today   Pertinent Positives:   Pertinent Negatives: Additional History of Present Condition:  Does gets itchy off and on    Assessment and Plan:  Based on a thorough discussion of this condition and the management approach to it (including a comprehensive discussion of the known risks, side effects and potential benefits of treatment), the patient (family) agrees to implement the following specific plan:   Sarna Sensitive lotion        Dry skin refers to skin that feels dry to touch  Dry skin has a dull surface with a rough, scaly quality  The skin is less pliable and cracked  When dryness is severe, the skin may become inflamed and fissured  Although any body site can be dry, dry skin tends to affect the shins more than any other site      Dry skin is lacking moisture in the outer horny cell layer (stratum corneum) and this results in cracks in the skin surface  Dry skin is also called xerosis, xeroderma or asteatosis (lack of fat)  It can affect males and females of all ages  There is some racial variability in water and lipid content of the skin   Dry skin that starts in early childhood may be one of about 20 types of ichthyosis (fish-scale skin)  There is often a family history of dry skin   Dry skin is commonly seen in people with atopic dermatitis   Nearly everyone > 60 years has dry skin  Dry skin that begins later may be seen in people with certain diseases and conditions   Postmenopausal women   Hypothyroidism   Chronic renal disease    Malnutrition and weight loss    Subclinical dermatitis    Treatment with certain drugs such as oral retinoids, diuretics and epidermal growth factor receptor inhibitors    People exposed to a dry environment may experience dry skin   Low humidity: in desert climates or cool, windy conditions    Excessive air conditioning    Direct heat from a fire or fan heater    Excessive bathing    Contact with soap, detergents and solvents    Inappropriate topical agents such as alcohol    Frictional irritation from rough clothing or abrasives    Dry skin is due to abnormalities in the integrity of the barrier function of the stratum corneum, which is made up of corneocytes   There is an overall reduction in the lipids in the stratum corneum   Ratio of ceramides, cholesterol and free fatty acids may be normal or altered   There may be a reduction in the proliferation of keratinocytes   Keratinocyte subtypes change in dry skin with a decrease in keratins K1, K10 and increase in K5, K14     Involucrin (a protein) may be expressed early, increasing cell stiffness   The result is retention of corneocytes and reduced water-holding capacity      The inherited forms of ichthyosis are due to loss of function mutations in various genes (listed in parentheses below)  The clinical features of ichthyosis depend on the specific type of ichthyosis:   Ichthyosis vulgaris (FLG)   Recessive X-linked ichthyosis (STS)    Autosomal recessive congenital ichthyosis (ABCA12, TGM1, ALOXE3)    Keratinopathic ichthyoses (KRT1, KRT10, KRT2)    Acquired ichthyosis may be due to:   Metabolic factors: thyroid deficiency    Illness: lymphoma, internal malignancy, sarcoidosis, HIV infection    Drugs: nicotinic acid, kava, protein kinase inhibitors (eg EGFR inhibitors), hydroxyurea  Complications of dry skin:  Dry areas of skin may become itchy, indicating a form of eczema/dermatitis has developed   Atopic eczema -- especially in people with ichthyosis vulgaris    Eczema craquelé -- especially in elderly people  Also called asteatotic eczema    A dry form of nummular dermatitis/discoid eczema -- especially in people that wash their skin excessively  When the dry skin of an elderly person is itchy without a visible rash, it is sometimes called winter itch, 7th age itch, senile pruritus or chronic pruritus of the elderly  Other complications of dry skin may include:   Skin infection when bacteria or viruses penetrate a break in the skin surface    Overheating, especially in some forms of ichthyosis    Food allergy, eg, to peanuts, has been associated with filaggrin mutations    Contact allergy, eg, to nickel, has also been correlated with barrier function defects  How is the type of dry skin diagnosed? The type of dry skin is diagnosed by careful history and examination  In children:   Family history    Age of onset    Appearance at birth, if known    Distribution of dry skin    Other features, eg eczema, abnormal nails, hair, dentition, sight, hearing      In adults:   Medical history    Medications and topical preparations    Bathing frequency and use of soap    Evaluation of environmental factors that may contribute to dry skin  What is the treatment for dry skin? The mainstay of treatment of dry skin and ichthyosis is moisturisers/emollients  They should be applied liberally and often enough to:   Reduce itch    Improve the barrier function    Prevent entry of irritants, bacteria    Reduce transepidermal water loss  When considering which emollient is most suitable, consider:   Severity of the dryness    Tolerance    Personal preference    Cost and availability  Emollients generally work best if applied to damp skin, if pH is below 7 (acidic), and if containing humectants such as urea or propylene glycol  Additional treatments include:   Topical steroid if itchy or there is dermatitis -- choose an emollient base    Topical calcineurin inhibitors if topical steroids are unsuitable  How can dry skin be prevented? Eliminate aggravating factors:   Reduce the frequency of bathing   A humidifier in winter and air conditioner in summer    Compare having a short shower with a prolonged soak in a bath   Use lukewarm, not hot, water   Replace standard soap with a substitute such as a synthetic detergent cleanser, water-miscible emollient, bath oil, anti-pruritic tar oil, colloidal oatmeal etc     Apply an emollient liberally and often, particularly shortly after bathing, and when itchy  The drier the skin, the thicker this should be, especially on the hands  What is the outlook for dry skin? A tendency to dry skin may persist life-long, or it may improve once contributing factors are controlled  Normal skin appearance today    Scribe Attestation    I,:  Kiki Mondragon am acting as a scribe while in the presence of the attending physician :       I,:  Glendy Donovan MD personally performed the services described in this documentation    as scribed in my presence :

## 2022-08-17 NOTE — PATIENT INSTRUCTIONS
HYPERPIGMENTATION OF SUN    Assessment and Plan:  Based on a thorough discussion of this condition and the management approach to it (including a comprehensive discussion of the known risks, side effects and potential benefits of treatment), the patient (family) agrees to implement the following specific plan:  Reassured benign                LENTIGO/START OF SEBORHEIC KERATOSIS    Assessment and Plan:  Based on a thorough discussion of this condition and the management approach to it (including a comprehensive discussion of the known risks, side effects and potential benefits of treatment), the patient (family) agrees to implement the following specific plan:  Reassured benign    What is a lentigo? A lentigo is a pigmented flat or slightly raised lesion with a clearly defined edge  Unlike an ephelis (freckle), it does not fade in the winter months  There are several kinds of lentigo  The name lentigo originally referred to its appearance resembling a small lentil  The plural of lentigo is lentigines, although lentigos is also in common use  Who gets lentigines? Lentigines can affect males and females of all ages and races  Solar lentigines are especially prevalent in fair skinned adults  Lentigines associated with syndromes are present at birth or arise during childhood  What causes lentigines? Common forms of lentigo are due to exposure to ultraviolet radiation:  Sun damage including sunburn   Indoor tanning   Phototherapy, especially photochemotherapy (PUVA)    Ionizing radiation, eg radiation therapy, can also cause lentigines  Several familial syndromes associated with widespread lentigines originate from mutations in Mynor-MAP kinase, mTOR signaling and PTEN pathways  What are the clinical features of lentigines?   Lentigines have been classified into several different types depending on what they look like, where they appear on the body, causative factors, and whether they are associated to other diseases or conditions  Lentigines may be solitary or more often, multiple  Most lentigines are smaller than 5 mm in diameter      Lentigo simplex  A precursor to junctional naevus   Arises during childhood and early adult life   Found on trunk and limbs   Small brown round or oval macule or thin plaque   Jagged or smooth edge   May have a dry surface   May disappear in time  Solar lentigo  A precursor to seborrhoeic keratosis   Found on chronically sun exposed sites such as hands, face, lower legs   May also follow sunburn to shoulders   Yellow, light or dark brown regular or irregular macule or thin plaque   May have a dry surface   Often has moth-eaten outline   Can slowly enlarge to several centimeters in diameter   May disappear, often through the process known as lichenoid keratosis   When atypical in appearance, may be difficult to distinguish from melanoma in situ  Ink spot lentigo  Also known as reticulated lentigo   Few in number compared to solar lentigines   Follows sunburn in very fair skinned individuals   Dark brown to black irregular ink spot-like macule  PUVA lentigo  Similar to ink spot lentigo but follows photochemotherapy (PUVA)   Location anywhere exposed to PUVA  Tanning bed lentigo  Similar to ink spot lentigo but follows indoor tanning   Location anywhere exposed to tanning bed  Radiation lentigo  Occurs in site of irradiation (accidental or therapeutic)   Associated with late-stage radiation dermatitis: epidermal atrophy, subcutaneous fibrosis, keratosis, telangiectasias  Melanotic macule  Mucosal surfaces or adjacent glabrous skin eg lip, vulva, penis, anus   Light to dark brown   Also called mucosal melanosis  Generalised lentigines  Found on any exposed or covered site from early childhood   Small macules may merge to form larger patches   Not associated with a syndrome   Also called lentigines profusa, multiple lentigines  Agminated lentigines  Naevoid eruption of lentigos confined to a single segmental area   Sharp demarcation in midline   May have associated neurological and developmental abnormalities  Patterned lentigines  Inherited tendency to lentigines on face, lips, buttocks, palms, soles   Recognised mainly in people of  ethnicity  Centrofacial neurodysraphic lentiginosis  Associated with mental retardation  Lentiginosis syndromes  Syndromes include LEOPARD/[place], Peutz-Jeghers, Laugier-Hunziker, Moynahan, Xeroderma pigmentosum, myxoma syndromes (HERRON, NAME, Rosa), Ruvalcaba-Myhre-Neff, Bannayan-Zonnana syndrome, Cowden disease (multiple hamartoma syndrome )   Inheritance is autosomal dominant; sporadic cases common   Widespread lentigines present at birth or arise in early childhood   Associated with neural, endocrine, and mesenchymal tumors    How is the diagnosis made? Lentigines are usually diagnosed clinically by their typical appearance  Concern regarding possibility of melanoma may lead to:  Dermatoscopy   Diagnostic excision biopsy    Histopathology of a lentigo shows: Thickened epidermis   An increased number of melanocytes along the basal layer of epidermis   Unlike junctional melanocytic naevus, there are no nests of melanocytes   Increased melanin pigment within the keratinocytes   Additional features depending on type of lentigo    In contrast, an ephelis (freckle) shows sun-induced increased melanin within the keratinocytes, without an increase in number of cells  What is the treatment for lentigines? Most lentigines are left alone  Attempts to lighten them may not be successful  The following approaches are used:  SPF 50+ broad-spectrum sunscreen   Hydroquinone bleaching cream   Alpha hydroxy acids   Vitamin C   Retinoids   Azelaic acid   Chemical peels  Individual lesions can be permanently removed using:  Cryotherapy   Intense pulsed light   Pigment lasers    How can lentigines be prevented?   Lentigines associated with exposure ultraviolet radiation can be prevented by very careful sun protection  Clothing is more successful at preventing new lentigines than are sunscreens  What is the outlook for lentigines? Lentigines usually persist  They may increase in number with age and sun exposure  Some in sun-protected sites may fade and disappear  XEROSIS ("DRY SKIN")    Assessment and Plan:  Based on a thorough discussion of this condition and the management approach to it (including a comprehensive discussion of the known risks, side effects and potential benefits of treatment), the patient (family) agrees to implement the following specific plan:  Sarna Sensitive lotion        Dry skin refers to skin that feels dry to touch  Dry skin has a dull surface with a rough, scaly quality  The skin is less pliable and cracked  When dryness is severe, the skin may become inflamed and fissured  Although any body site can be dry, dry skin tends to affect the shins more than any other site  Dry skin is lacking moisture in the outer horny cell layer (stratum corneum) and this results in cracks in the skin surface  Dry skin is also called xerosis, xeroderma or asteatosis (lack of fat)  It can affect males and females of all ages  There is some racial variability in water and lipid content of the skin  Dry skin that starts in early childhood may be one of about 20 types of ichthyosis (fish-scale skin)  There is often a family history of dry skin  Dry skin is commonly seen in people with atopic dermatitis  Nearly everyone > 60 years has dry skin  Dry skin that begins later may be seen in people with certain diseases and conditions  Postmenopausal women  Hypothyroidism  Chronic renal disease   Malnutrition and weight loss   Subclinical dermatitis   Treatment with certain drugs such as oral retinoids, diuretics and epidermal growth factor receptor inhibitors    People exposed to a dry environment may experience dry skin    Low humidity: in desert climates or cool, windy conditions   Excessive air conditioning   Direct heat from a fire or fan heater   Excessive bathing   Contact with soap, detergents and solvents   Inappropriate topical agents such as alcohol   Frictional irritation from rough clothing or abrasives    Dry skin is due to abnormalities in the integrity of the barrier function of the stratum corneum, which is made up of corneocytes  There is an overall reduction in the lipids in the stratum corneum  Ratio of ceramides, cholesterol and free fatty acids may be normal or altered  There may be a reduction in the proliferation of keratinocytes  Keratinocyte subtypes change in dry skin with a decrease in keratins K1, K10 and increase in K5, K14  Involucrin (a protein) may be expressed early, increasing cell stiffness  The result is retention of corneocytes and reduced water-holding capacity  The inherited forms of ichthyosis are due to loss of function mutations in various genes (listed in parentheses below)  The clinical features of ichthyosis depend on the specific type of ichthyosis:  Ichthyosis vulgaris (FLG)  Recessive X-linked ichthyosis (STS)   Autosomal recessive congenital ichthyosis (ABCA12, TGM1, ALOXE3)   Keratinopathic ichthyoses (KRT1, KRT10, KRT2)    Acquired ichthyosis may be due to:  Metabolic factors: thyroid deficiency   Illness: lymphoma, internal malignancy, sarcoidosis, HIV infection   Drugs: nicotinic acid, kava, protein kinase inhibitors (eg EGFR inhibitors), hydroxyurea  Complications of dry skin:  Dry areas of skin may become itchy, indicating a form of eczema/dermatitis has developed  Atopic eczema -- especially in people with ichthyosis vulgaris   Eczema craquelé -- especially in elderly people  Also called asteatotic eczema   A dry form of nummular dermatitis/discoid eczema -- especially in people that wash their skin excessively    When the dry skin of an elderly person is itchy without a visible rash, it is sometimes called winter itch, 7th age itch, senile pruritus or chronic pruritus of the elderly  Other complications of dry skin may include:  Skin infection when bacteria or viruses penetrate a break in the skin surface   Overheating, especially in some forms of ichthyosis   Food allergy, eg, to peanuts, has been associated with filaggrin mutations   Contact allergy, eg, to nickel, has also been correlated with barrier function defects  How is the type of dry skin diagnosed? The type of dry skin is diagnosed by careful history and examination  In children:  Family history   Age of onset   Appearance at birth, if known   Distribution of dry skin   Other features, eg eczema, abnormal nails, hair, dentition, sight, hearing  In adults:  Medical history   Medications and topical preparations   Bathing frequency and use of soap   Evaluation of environmental factors that may contribute to dry skin  What is the treatment for dry skin? The mainstay of treatment of dry skin and ichthyosis is moisturisers/emollients  They should be applied liberally and often enough to:  Reduce itch   Improve the barrier function   Prevent entry of irritants, bacteria   Reduce transepidermal water loss  When considering which emollient is most suitable, consider:  Severity of the dryness   Tolerance   Personal preference   Cost and availability  Emollients generally work best if applied to damp skin, if pH is below 7 (acidic), and if containing humectants such as urea or propylene glycol  Additional treatments include:  Topical steroid if itchy or there is dermatitis -- choose an emollient base   Topical calcineurin inhibitors if topical steroids are unsuitable  How can dry skin be prevented? Eliminate aggravating factors:  Reduce the frequency of bathing  A humidifier in winter and air conditioner in summer   Compare having a short shower with a prolonged soak in a bath  Use lukewarm, not hot, water     Replace standard soap with a substitute such as a synthetic detergent cleanser, water-miscible emollient, bath oil, anti-pruritic tar oil, colloidal oatmeal etc    Apply an emollient liberally and often, particularly shortly after bathing, and when itchy  The drier the skin, the thicker this should be, especially on the hands  What is the outlook for dry skin? A tendency to dry skin may persist life-long, or it may improve once contributing factors are controlled

## 2022-08-23 DIAGNOSIS — E10.649 TYPE 1 DIABETES MELLITUS WITH HYPOGLYCEMIA AND WITHOUT COMA (HCC): ICD-10-CM

## 2022-08-24 RX ORDER — BLOOD-GLUCOSE TRANSMITTER
EACH MISCELLANEOUS
Qty: 1 EACH | Refills: 11 | OUTPATIENT
Start: 2022-08-24

## 2022-08-24 NOTE — TELEPHONE ENCOUNTER
Patient needs appt for more refills  When appt has been made, let me know when appt is and will rx supply to last until appt       Has not been seen since February- needs labs/appt

## 2022-08-25 RX ORDER — BLOOD-GLUCOSE TRANSMITTER
EACH MISCELLANEOUS
Qty: 1 EACH | Refills: 0 | Status: SHIPPED | OUTPATIENT
Start: 2022-08-25

## 2022-08-29 LAB
LEFT EYE DIABETIC RETINOPATHY: NORMAL
RIGHT EYE DIABETIC RETINOPATHY: NORMAL

## 2022-08-29 PROCEDURE — 2023F DILAT RTA XM W/O RTNOPTHY: CPT | Performed by: DERMATOLOGY

## 2022-10-05 DIAGNOSIS — I10 ESSENTIAL HYPERTENSION: ICD-10-CM

## 2022-10-05 RX ORDER — LISINOPRIL 5 MG/1
5 TABLET ORAL DAILY
Qty: 90 TABLET | Refills: 0 | Status: SHIPPED | OUTPATIENT
Start: 2022-10-05

## 2022-10-05 NOTE — TELEPHONE ENCOUNTER
Medication Refill Request     Name lisinopril (ZESTRIL) 5 mg tablet  Dose/Frequency TAKE ONE TABLET BY MOUTH EVERY DAY  Quantity 30 tablet  Verified pharmacy   [x]  Verified ordering Provider   [x]  Does patient have enough for the next 3 days? Yes [] No [x]    Patient requested this med get filled by his PCP  His last Provider is no longer there

## 2022-10-12 ENCOUNTER — OFFICE VISIT (OUTPATIENT)
Dept: ENDOCRINOLOGY | Facility: CLINIC | Age: 32
End: 2022-10-12
Payer: COMMERCIAL

## 2022-10-12 VITALS
SYSTOLIC BLOOD PRESSURE: 120 MMHG | BODY MASS INDEX: 23.74 KG/M2 | HEIGHT: 74 IN | DIASTOLIC BLOOD PRESSURE: 80 MMHG | WEIGHT: 185 LBS | OXYGEN SATURATION: 98 % | HEART RATE: 97 BPM

## 2022-10-12 DIAGNOSIS — Z79.4 CURRENT USE OF INSULIN (HCC): ICD-10-CM

## 2022-10-12 DIAGNOSIS — I10 ESSENTIAL HYPERTENSION: ICD-10-CM

## 2022-10-12 DIAGNOSIS — E10.649 TYPE 1 DIABETES MELLITUS WITH HYPOGLYCEMIA AND WITHOUT COMA (HCC): Primary | ICD-10-CM

## 2022-10-12 LAB — SL AMB POCT HEMOGLOBIN AIC: 6.9 (ref ?–6.5)

## 2022-10-12 PROCEDURE — 83036 HEMOGLOBIN GLYCOSYLATED A1C: CPT

## 2022-10-12 PROCEDURE — 95251 CONT GLUC MNTR ANALYSIS I&R: CPT

## 2022-10-12 PROCEDURE — 99214 OFFICE O/P EST MOD 30 MIN: CPT

## 2022-10-12 RX ORDER — GLUCAGON 3 MG/1
POWDER NASAL
Qty: 1 EACH | Refills: 2 | Status: SHIPPED | OUTPATIENT
Start: 2022-10-12

## 2022-10-12 RX ORDER — INSULIN DEGLUDEC INJECTION 100 U/ML
16 INJECTION, SOLUTION SUBCUTANEOUS DAILY
Qty: 15 ML | Refills: 1 | Status: SHIPPED | OUTPATIENT
Start: 2022-10-12

## 2022-10-12 NOTE — PROGRESS NOTES
Established Patient Progress Note      Chief Complaint   Patient presents with   • Diabetes Type 1        History of Present Illness:   Neelam Askew is a 29 y o  male with type 1 diabetes with long term use of insulin since August 2017  Last seen in the office 2/9/22  Reports complications of none  POCT A1C was 6 9  Denies recent illness or hospitalizations  Denies recent severe hypoglycemic or severe hyperglycemic episodes  Denies any issues with his current regimen  Home glucose monitoring: are performed regularly with CGM  Reports his diet is very good, less exercise in winter but still stays active  Had COVID 1 month ago - did not effect BGs  Reports BGs have been running higher and he has increased insulin amounts gradually within the last few months  Current regimen:   Tresiba 16 units daily   Novolog with carb ratio of 1:12 (with activity) or 1:15 (without activity), ISF: 1:40 mg/dL, BG target 110    Neelam Askew   Device used: Dexcom G6  Home use   Indication: Type 1 Diabetes  More than 72 hours of data was reviewed  Report to be scanned to chart  Date Range: 9/29/22-10/12/22  Analysis of data:   Average Glucose: 165  SD : 46   Time in Target Range: 67%    Time Above Range: 27% high, 5% very high    Time Below Range: <1% low   Interpretation of data: Hyperglycemia between 5pm-8pm, some hyperglycemia in the morning, overall BGs well controlled        Last Eye Exam: 8/28/22, UTD, no retinopathy   Last Foot Exam: 5/31/22, UTD     Has hypertension: Taking lisinopril     Patient Active Problem List   Diagnosis   • Type 1 diabetes mellitus with hypoglycemia ([facility] [address] )   • Routine general medical examination at a health care facility   • Annual physical exam   • Sinus congestion   • Elevated blood pressure reading without diagnosis of hypertension   • Deviated septum   • LUQ pain   • Chest discomfort   • Belching   • Current use of insulin (HCC)   • Essential hypertension   • Non-healing skin lesion Past Medical History:   Diagnosis Date   • Allergic     seasonal allergies   • Diabetes mellitus ([facility] [address] ) 2015    Type 1   • Essential hypertension 6/29/2021   • Visual impairment     wears corrective lenses      Past Surgical History:   Procedure Laterality Date   • NO PAST SURGERIES        Family History   Problem Relation Age of Onset   • Diabetes Mother    • Arthritis Mother    • Substance Abuse Mother    • Hyperlipidemia Father    • Substance Abuse Father    • Anxiety disorder Brother      Social History     Tobacco Use   • Smoking status: Current Some Day Smoker     Types: Cigars   • Smokeless tobacco: Never Used   Substance Use Topics   • Alcohol use: Yes     Comment: 3-4 beers on 1-2 nights per week     No Known Allergies      Current Outpatient Medications:   •  BD Pen Needle Estelle 2nd Gen 32G X 4 MM MISC, USE ONE PEN NEEDLE FOUR TIMES A DAY, Disp: 200 each, Rfl: 0  •  Continuous Blood Gluc Sensor (Dexcom G6 Sensor) MISC, CHANGE EVERY 10 DAYS, Disp: 3 each, Rfl: 11  •  Continuous Blood Gluc Transmit (Dexcom G6 Transmitter) MISC, Change every 90 days, Disp: 1 each, Rfl: 0  •  Glucagon (Baqsimi One Pack) 3 MG/DOSE POWD, Spray as directed for hypoglycemic emerency, Disp: 1 each, Rfl: 2  •  insulin aspart (NovoLOG FlexPen) 100 UNIT/ML injection pen, INJECT 10 UNITS UNDER THE SKIN UP TO THREE TIMES A DAY WITH CARB COUNTING, Disp: 30 mL, Rfl: 1  •  lisinopril (ZESTRIL) 5 mg tablet, Take 1 tablet (5 mg total) by mouth daily, Disp: 90 tablet, Rfl: 0  •  Tresiba FlexTouch 100 units/mL injection pen, Inject 16 Units under the skin daily, Disp: 15 mL, Rfl: 1  •  glucose blood (ONE TOUCH ULTRA TEST) test strip, USE TO TEST BLOOD SUGAR 8 TIMES A DAY (Patient not taking: Reported on 10/12/2022), Disp: 200 each, Rfl: 0    Review of Systems   Constitutional: Negative for activity change, appetite change, chills, diaphoresis, fatigue, fever and unexpected weight change  Eyes: Negative for visual disturbance     Respiratory: Negative for chest tightness and shortness of breath  Cardiovascular: Negative for chest pain, palpitations and leg swelling  Gastrointestinal: Negative for abdominal pain, constipation, diarrhea, nausea and vomiting  Endocrine: Negative for polydipsia, polyphagia and polyuria  Skin: Negative for color change, pallor, rash and wound  Neurological: Negative for dizziness, tremors, weakness, light-headedness and numbness  All other systems reviewed and are negative  Physical Exam:  Body mass index is 23 75 kg/m²  /80 (BP Location: Left arm, Patient Position: Sitting, Cuff Size: Standard)   Pulse 97   Ht 6' 2" (1 88 m)   Wt 83 9 kg (185 lb)   SpO2 98%   BMI 23 75 kg/m²    Wt Readings from Last 3 Encounters:   10/12/22 83 9 kg (185 lb)   08/17/22 83 9 kg (185 lb)   05/31/22 83 5 kg (184 lb)       Physical Exam  Vitals reviewed  Constitutional:       Appearance: Normal appearance  He is normal weight  HENT:      Head: Normocephalic  Cardiovascular:      Rate and Rhythm: Normal rate and regular rhythm  Pulses: Normal pulses  Heart sounds: Normal heart sounds  No murmur heard  Pulmonary:      Effort: Pulmonary effort is normal  No respiratory distress  Breath sounds: Normal breath sounds  No wheezing, rhonchi or rales  Musculoskeletal:      Right lower leg: No edema  Left lower leg: No edema  Skin:     General: Skin is warm and dry  Neurological:      Mental Status: He is alert and oriented to person, place, and time  Psychiatric:         Mood and Affect: Mood normal          Behavior: Behavior normal          Thought Content:  Thought content normal          Judgment: Judgment normal        Labs:   Lab Results   Component Value Date    HGBA1C 6 9 (A) 10/12/2022    HGBA1C 6 7 (A) 06/29/2021    HGBA1C 6 0 05/29/2019     Lab Results   Component Value Date    CREATININE 0 93 12/11/2018    CREATININE 0 93 09/04/2018    CREATININE 0 95 05/16/2018    BUN 16 12/11/2018    K 4 8 12/11/2018     12/11/2018    CO2 29 12/11/2018     eGFR   Date Value Ref Range Status   12/11/2018 113 ml/min/1 73sq m Final     Lab Results   Component Value Date    HDL 61 (H) 05/16/2018    TRIG 68 05/16/2018     Lab Results   Component Value Date    ALT 18 12/11/2018    AST 7 12/11/2018    ALKPHOS 50 12/11/2018     Lab Results   Component Value Date    [id number] 2 070 12/11/2018     Lab Results   Component Value Date    FREET4 1 05 12/11/2018       Impression & Plan:    Problem List Items Addressed This Visit        Endocrine    Type 1 diabetes mellitus with hypoglycemia ([facility] [address] ) - Primary     HGA1C at goal  Having some hyperglycemia after breakfast and dinner, reports BGs have been gradually increasing  Rare hypoglycemia  Treatment regimen: Will change carb ratio to 1:10 when he is active and ISF to 1:30 for BG target of 110  Continue with Tresiba 16 units daily  Discussed risks/complications associated with uncontrolled diabetes  Advised to adhere to diabetic diet, and recommended staying active/exercising routinely  Keep carbohydrates consistent to limit blood glucose fluctuations  Advised to call if blood sugars less than 70 mg/dl or over 300 mg/dl  Continue with CGM  Discussed symptoms and treatment of hypoglycemia  Recommended routine follow-up with podiatry and ophthalmology  Ordered blood work to complete prior to next visit  Lab Results   Component Value Date    HGBA1C 6 9 (A) 10/12/2022            Relevant Medications    Glucagon (Baqsimi One Pack) 3 MG/DOSE POWD    Tresiba FlexTouch 100 units/mL injection pen    Other Relevant Orders    POCT hemoglobin A1c (Completed)       Cardiovascular and Mediastinum    Essential hypertension     BP at goal  Continue current medication regimen               Other    Current use of insulin (HCC)    Relevant Medications    Glucagon (Baqsimi One Pack) 3 MG/DOSE POWD          Orders Placed This Encounter   Procedures   • POCT hemoglobin A1c       Patient Instructions   Continue with Modesto Cabrera at current dose  Change carb ratio 1:10 and correction factor 1:30 for Bg target of 110  Discussed with the patient and all questioned fully answered  He will call me if any problems arise      MATT Huerta

## 2022-10-12 NOTE — ASSESSMENT & PLAN NOTE
HGA1C at goal  Having some hyperglycemia after breakfast and dinner, reports BGs have been gradually increasing  Rare hypoglycemia  Treatment regimen: Will change carb ratio to 1:10 when he is active and ISF to 1:30 for BG target of 110  Continue with Tresiba 16 units daily  Discussed risks/complications associated with uncontrolled diabetes  Advised to adhere to diabetic diet, and recommended staying active/exercising routinely  Keep carbohydrates consistent to limit blood glucose fluctuations  Advised to call if blood sugars less than 70 mg/dl or over 300 mg/dl  Continue with CGM  Discussed symptoms and treatment of hypoglycemia  Recommended routine follow-up with podiatry and ophthalmology  Ordered blood work to complete prior to next visit    Lab Results   Component Value Date    HGBA1C 6 9 (A) 10/12/2022

## 2022-10-12 NOTE — PATIENT INSTRUCTIONS
Continue with [place] at current dose  Change carb ratio 1:10 and correction factor 1:30 for Bg target of 110

## 2022-10-14 ENCOUNTER — APPOINTMENT (OUTPATIENT)
Dept: LAB | Facility: CLINIC | Age: 32
End: 2022-10-14
Payer: COMMERCIAL

## 2022-10-14 DIAGNOSIS — E10.649 TYPE 1 DIABETES MELLITUS WITH HYPOGLYCEMIA AND WITHOUT COMA (HCC): ICD-10-CM

## 2022-10-14 LAB
ALBUMIN SERPL BCP-MCNC: 4 G/DL (ref 3.5–5)
ALP SERPL-CCNC: 52 U/L (ref 46–116)
ALT SERPL W P-5'-P-CCNC: 30 U/L (ref 12–78)
ANION GAP SERPL CALCULATED.3IONS-SCNC: 6 MMOL/L (ref 4–13)
AST SERPL W P-5'-P-CCNC: 11 U/L (ref 5–45)
BILIRUB SERPL-MCNC: 0.77 MG/DL (ref 0.2–1)
BUN SERPL-MCNC: 25 MG/DL (ref 5–25)
CALCIUM SERPL-MCNC: 9.3 MG/DL (ref 8.3–10.1)
CHLORIDE SERPL-SCNC: 106 MMOL/L (ref 96–108)
CHOLEST SERPL-MCNC: 208 MG/DL
CO2 SERPL-SCNC: 24 MMOL/L (ref 21–32)
CREAT SERPL-MCNC: 1.04 MG/DL (ref 0.6–1.3)
CREAT UR-MCNC: 193 MG/DL
GFR SERPL CREATININE-BSD FRML MDRD: 95 ML/MIN/1.73SQ M
GLUCOSE P FAST SERPL-MCNC: 157 MG/DL (ref 65–99)
HDLC SERPL-MCNC: 54 MG/DL
LDLC SERPL CALC-MCNC: 138 MG/DL (ref 0–100)
MICROALBUMIN UR-MCNC: 8.4 MG/L (ref 0–20)
MICROALBUMIN/CREAT 24H UR: 4 MG/G CREATININE (ref 0–30)
NONHDLC SERPL-MCNC: 154 MG/DL
POTASSIUM SERPL-SCNC: 4.4 MMOL/L (ref 3.5–5.3)
PROT SERPL-MCNC: 8 G/DL (ref 6.4–8.4)
SODIUM SERPL-SCNC: 136 MMOL/L (ref 135–147)
TRIGL SERPL-MCNC: 80 MG/DL

## 2022-10-14 PROCEDURE — 36415 COLL VENOUS BLD VENIPUNCTURE: CPT

## 2022-10-14 PROCEDURE — 80061 LIPID PANEL: CPT

## 2022-10-14 PROCEDURE — 82570 ASSAY OF URINE CREATININE: CPT

## 2022-10-14 PROCEDURE — 80053 COMPREHEN METABOLIC PANEL: CPT

## 2022-10-14 PROCEDURE — 82043 UR ALBUMIN QUANTITATIVE: CPT

## 2022-10-26 DIAGNOSIS — E10.9 TYPE 1 DIABETES MELLITUS WITHOUT COMPLICATION (HCC): ICD-10-CM

## 2022-10-26 RX ORDER — PEN NEEDLE, DIABETIC 32GX 5/32"
NEEDLE, DISPOSABLE MISCELLANEOUS
Qty: 200 EACH | Refills: 0 | Status: SHIPPED | OUTPATIENT
Start: 2022-10-26

## 2022-12-01 DIAGNOSIS — E10.649 TYPE 1 DIABETES MELLITUS WITH HYPOGLYCEMIA AND WITHOUT COMA (HCC): ICD-10-CM

## 2022-12-02 RX ORDER — BLOOD-GLUCOSE TRANSMITTER
EACH MISCELLANEOUS
Qty: 1 EACH | Refills: 11 | Status: SHIPPED | OUTPATIENT
Start: 2022-12-02

## 2023-01-08 DIAGNOSIS — I10 ESSENTIAL HYPERTENSION: ICD-10-CM

## 2023-01-10 RX ORDER — LISINOPRIL 5 MG/1
TABLET ORAL
Qty: 90 TABLET | Refills: 0 | Status: SHIPPED | OUTPATIENT
Start: 2023-01-10

## 2023-02-07 DIAGNOSIS — E10.9 TYPE 1 DIABETES MELLITUS WITHOUT COMPLICATION (HCC): ICD-10-CM

## 2023-02-07 RX ORDER — PEN NEEDLE, DIABETIC 32GX 5/32"
NEEDLE, DISPOSABLE MISCELLANEOUS
Qty: 200 EACH | Refills: 0 | Status: SHIPPED | OUTPATIENT
Start: 2023-02-07

## 2023-02-09 DIAGNOSIS — E10.649 TYPE 1 DIABETES MELLITUS WITH HYPOGLYCEMIA AND WITHOUT COMA (HCC): ICD-10-CM

## 2023-02-10 RX ORDER — INSULIN ASPART 100 [IU]/ML
INJECTION, SOLUTION INTRAVENOUS; SUBCUTANEOUS
Qty: 30 ML | Refills: 1 | Status: SHIPPED | OUTPATIENT
Start: 2023-02-10

## 2023-03-27 DIAGNOSIS — E10.9 TYPE 1 DIABETES MELLITUS WITHOUT COMPLICATION (HCC): ICD-10-CM

## 2023-04-07 RX ORDER — PEN NEEDLE, DIABETIC 32GX 5/32"
NEEDLE, DISPOSABLE MISCELLANEOUS
Qty: 200 EACH | Refills: 0 | Status: SHIPPED | OUTPATIENT
Start: 2023-04-07

## 2023-04-07 NOTE — TELEPHONE ENCOUNTER
Spoke to patient he has not moved yet and is still in [place], he is going to call back to schedule his physical after his wife has the baby as she is due right around the time he is due for his physical  Please advise if you are able to fill his medication

## 2023-05-01 DIAGNOSIS — I10 ESSENTIAL HYPERTENSION: ICD-10-CM

## 2023-05-02 RX ORDER — LISINOPRIL 5 MG/1
TABLET ORAL
Qty: 90 TABLET | Refills: 0 | Status: SHIPPED | OUTPATIENT
Start: 2023-05-02

## 2023-05-25 ENCOUNTER — TELEPHONE (OUTPATIENT)
Dept: OTHER | Facility: OTHER | Age: 33
End: 2023-05-25

## 2023-05-25 NOTE — TELEPHONE ENCOUNTER
Pt called in stating his insurance no longer covers Novalog and he has to now use Humalog  He is all out of his medicine and would like it sent to [address]

## 2023-05-26 DIAGNOSIS — E10.649 TYPE 1 DIABETES MELLITUS WITH HYPOGLYCEMIA AND WITHOUT COMA (HCC): Primary | ICD-10-CM

## 2023-05-26 DIAGNOSIS — E10.9 TYPE 1 DIABETES MELLITUS WITHOUT COMPLICATION (HCC): Primary | ICD-10-CM

## 2023-05-26 RX ORDER — INSULIN LISPRO 100 [IU]/ML
INJECTION, SOLUTION INTRAVENOUS; SUBCUTANEOUS
Qty: 15 ML | Refills: 0 | Status: CANCELLED | OUTPATIENT
Start: 2023-05-26

## 2023-05-26 RX ORDER — INSULIN LISPRO 200 [IU]/ML
INJECTION, SOLUTION SUBCUTANEOUS
Qty: 6 ML | Refills: 0 | Status: CANCELLED | OUTPATIENT
Start: 2023-05-26

## 2023-05-26 RX ORDER — INSULIN LISPRO 100 [IU]/ML
INJECTION, SOLUTION INTRAVENOUS; SUBCUTANEOUS
Qty: 15 ML | Refills: 0 | Status: SHIPPED | OUTPATIENT
Start: 2023-05-26

## 2023-05-26 NOTE — TELEPHONE ENCOUNTER
Request sent to covering provider   Patient made aware to contact Endo for further fills/medication change

## 2023-05-26 NOTE — TELEPHONE ENCOUNTER
Patient called back to check on the status of this script as he is out of insulin and needs it filled ASAP  Please call him when it is filled so he can pick it up

## 2023-06-19 DIAGNOSIS — E10.9 TYPE 1 DIABETES MELLITUS WITHOUT COMPLICATION (HCC): ICD-10-CM

## 2023-06-19 RX ORDER — PEN NEEDLE, DIABETIC 32GX 5/32"
NEEDLE, DISPOSABLE MISCELLANEOUS
Qty: 200 EACH | Refills: 0 | Status: SHIPPED | OUTPATIENT
Start: 2023-06-19

## 2023-07-05 DIAGNOSIS — E10.649 TYPE 1 DIABETES MELLITUS WITH HYPOGLYCEMIA AND WITHOUT COMA (HCC): ICD-10-CM

## 2023-07-06 ENCOUNTER — OFFICE VISIT (OUTPATIENT)
Dept: FAMILY MEDICINE CLINIC | Facility: CLINIC | Age: 33
End: 2023-07-06
Payer: COMMERCIAL

## 2023-07-06 VITALS
WEIGHT: 187.4 LBS | TEMPERATURE: 97.4 F | HEIGHT: 74 IN | HEART RATE: 78 BPM | OXYGEN SATURATION: 99 % | RESPIRATION RATE: 14 BRPM | SYSTOLIC BLOOD PRESSURE: 132 MMHG | BODY MASS INDEX: 24.05 KG/M2 | DIASTOLIC BLOOD PRESSURE: 80 MMHG

## 2023-07-06 DIAGNOSIS — Z00.00 ANNUAL PHYSICAL EXAM: Primary | ICD-10-CM

## 2023-07-06 DIAGNOSIS — E10.649 TYPE 1 DIABETES MELLITUS WITH HYPOGLYCEMIA AND WITHOUT COMA (HCC): ICD-10-CM

## 2023-07-06 PROCEDURE — 99395 PREV VISIT EST AGE 18-39: CPT | Performed by: NURSE PRACTITIONER

## 2023-07-06 NOTE — ASSESSMENT & PLAN NOTE
Stable with current management. , managed by endo. Continue with treatment per their recommendations. F/u scheduled in September and will have a1c and foot exam done there.   Schedule eye exam.  Continue to monitor    Lab Results   Component Value Date    HGBA1C 6.9 (A) 10/12/2022

## 2023-07-06 NOTE — PROGRESS NOTES
[address] [facility]    NAME: Kentrell Sandoval  AGE: 34 y.o. SEX: male  : 1990     DATE: 2023     Assessment and Plan:     Problem List Items Addressed This Visit        Endocrine    Type 1 diabetes mellitus with hypoglycemia ([address])     Stable with current management. , managed by endo. Continue with treatment per their recommendations. F/u scheduled in September and will have a1c and foot exam done there. Schedule eye exam.  Continue to monitor    Lab Results   Component Value Date    HGBA1C 6.9 (A) 10/12/2022               Other    Annual physical exam - Primary     Unremarkable exam of adult male. Vaccines up to date. Continue with healthy diet, regular exercise. Continue regular eye exams, overdue for dental ppx. Immunizations and preventive care screenings were discussed with patient today. Appropriate education was printed on patient's after visit summary. Counseling:  Dental Health: discussed importance of regular tooth brushing, flossing, and dental visits. Injury prevention: discussed safety/seat belts, safety helmets, smoke detectors, carbon dioxide detectors, and smoking near bedding or upholstery. · Exercise: the importance of regular exercise/physical activity was discussed. Recommend exercise 3-5 times per week for at least 30 minutes. Depression Screening and Follow-up Plan: Patient was screened for depression during today's encounter. They screened negative with a PHQ-2 score of 0. Tobacco Cessation Counseling: Tobacco cessation counseling was provided. The patient is sincerely urged to quit consumption of tobacco. He is ready to quit tobacco. Smokes cigars rarely        No follow-ups on file.      Chief Complaint:     Chief Complaint   Patient presents with   • Physical Exam     Patient being seen for physical       History of Present Illness:     Adult Annual Physical   Patient here for a comprehensive physical exam. The patient reports no problems. Diet and Physical Activity  · Diet/Nutrition: well balanced diet and diabetic diet. · Exercise: vigorous cardiovascular exercise and 5-7 times a week on average. Depression Screening  PHQ-2/9 Depression Screening    Little interest or pleasure in doing things: 0 - not at all  Feeling down, depressed, or hopeless: 0 - not at all  PHQ-2 Score: 0  PHQ-2 Interpretation: Negative depression screen       General Health  · Sleep: sleeps well and gets 4-6 hours of sleep on average. · Hearing: normal - bilateral.  · Vision: no vision problems, goes for regular eye exams and most recent eye exam <1 year ago. · Dental: no dental visits for >1 year and brushes teeth twice daily.  Health  · History of STDs?: no.     Review of Systems:     Review of Systems   Constitutional: Negative for activity change, appetite change, chills, fatigue, fever and unexpected weight change. HENT: Negative for hearing loss. Eyes: Negative for visual disturbance. Respiratory: Negative for cough, chest tightness and shortness of breath. Cardiovascular: Negative for chest pain, palpitations and leg swelling. Gastrointestinal: Negative for abdominal pain, constipation, diarrhea, nausea and vomiting. Genitourinary: Negative for difficulty urinating, dysuria and frequency. Musculoskeletal: Negative for arthralgias and myalgias. Skin: Negative. Allergic/Immunologic: Negative for environmental allergies. Neurological: Negative for dizziness, weakness, light-headedness, numbness and headaches. Psychiatric/Behavioral: Negative for dysphoric mood and sleep disturbance. The patient is not nervous/anxious.        Past Medical History:     Past Medical History:   Diagnosis Date   • Allergic     seasonal allergies   • Diabetes mellitus ([address]) 2015    Type 1   • Essential hypertension 6/29/2021   • Visual impairment     wears corrective lenses      Past Surgical History:     Past Surgical History:   Procedure Laterality Date   • NO PAST SURGERIES        Social History:     Social History     Socioeconomic History   • Marital status: /Civil Union     Spouse name: None   • Number of children: None   • Years of education: None   • Highest education level: None   Occupational History   • None   Tobacco Use   • Smoking status: Some Days     Types: Cigars   • Smokeless tobacco: Never   Vaping Use   • Vaping Use: Never used   Substance and Sexual Activity   • Alcohol use: Yes     Comment: 3-4 beers on 1-2 nights per week   • Drug use: Never   • Sexual activity: Yes     Partners: Female   Other Topics Concern   • None   Social History Narrative    Lives with his wife    Works full time, as a . Caffeine consumption is daily coffee 1-2 cups     Social Determinants of Health     Financial Resource Strain: Not on file   Food Insecurity: Not on file   Transportation Needs: Not on file   Physical Activity: Not on file   Stress: Not on file   Social Connections: Not on file   Intimate Partner Violence: Not on file   Housing Stability: Not on file      Family History:     Family History   Problem Relation Age of Onset   • Diabetes Mother    • Arthritis Mother    • Substance Abuse Mother    • Hyperlipidemia Father    • Substance Abuse Father    • Anxiety disorder Brother    • No Known Problems Daughter       Current Medications:     Current Outpatient Medications   Medication Sig Dispense Refill   • Continuous Blood Gluc Sensor (Dexcom G6 Sensor) MISC Change it every 10 days.  9 each 0   • Continuous Blood Gluc Transmit (Dexcom G6 Transmitter) MISC Change it every 90 days 1 each 0   • Glucagon (Baqsimi One Pack) 3 MG/DOSE POWD Spray as directed for hypoglycemic emerency 1 each 2   • HumaLOG KwikPen 100 units/mL injection pen INJECT 10 UNITS UNDER THE SKIN UP TO THREE TIMES A DAY WITH CARB COUNTING AS DIRECTED 30 mL 1   • lisinopril (ZESTRIL) 5 mg tablet TAKE ONE TABLET BY MOUTH EVERY DAY 90 tablet 0   • Tresiba FlexTouch 100 units/mL injection pen Inject 16 Units under the skin daily 15 mL 1   • BD Pen Needle Estelle 2nd Gen 32G X 4 MM MISC USE ONE PEN NEEDLE FOUR TIMES DAILY 200 each 0   • glucose blood (ONE TOUCH ULTRA TEST) test strip USE TO TEST BLOOD SUGAR 8 TIMES A DAY (Patient not taking: Reported on 10/12/2022) 200 each 0     No current facility-administered medications for this visit. Allergies:     No Known Allergies   Physical Exam:     /80 (BP Location: Left arm, Patient Position: Sitting, Cuff Size: Standard)   Pulse 78   Temp (!) 97.4 °F (36.3 °C) (Tympanic)   Resp 14   Ht 6' 2.02" (1.88 m)   Wt 85 kg (187 lb 6.4 oz)   SpO2 99%   BMI 24.05 kg/m²     Physical Exam  Vitals reviewed. Constitutional:       General: He is awake. He is not in acute distress. Appearance: Normal appearance. He is well-developed and well-groomed. He is not ill-appearing. HENT:      Head: Normocephalic and atraumatic. Right Ear: Hearing, tympanic membrane, ear canal and external ear normal.      Left Ear: Hearing, tympanic membrane, ear canal and external ear normal.      Nose: Nose normal.      Mouth/Throat:      Lips: Pink. Eyes:      General: Lids are normal.      Conjunctiva/sclera: Conjunctivae normal.      Pupils: Pupils are equal, round, and reactive to light. Neck:      Thyroid: No thyromegaly. Vascular: Normal carotid pulses. No carotid bruit or JVD. Cardiovascular:      Rate and Rhythm: Normal rate and regular rhythm. Pulses: Normal pulses. Heart sounds: Normal heart sounds. No murmur heard. Pulmonary:      Effort: Pulmonary effort is normal.      Breath sounds: Normal breath sounds. Abdominal:      General: Abdomen is flat. Bowel sounds are normal.      Palpations: Abdomen is soft. Tenderness: There is no abdominal tenderness. Musculoskeletal:         General: Normal range of motion.       Cervical back: Normal range of motion. Right lower leg: No edema. Left lower leg: No edema. Skin:     General: Skin is warm and dry. Capillary Refill: Capillary refill takes less than 2 seconds. Neurological:      Mental Status: He is alert and oriented to person, place, and time. Motor: Motor function is intact. Psychiatric:         Attention and Perception: Attention normal.         Mood and Affect: Mood normal.         Speech: Speech normal.         Behavior: Behavior normal. Behavior is cooperative. Thought Content:  Thought content normal.         Cognition and Memory: Cognition normal.         Judgment: Judgment normal.          Alyse Madrid, [address] [place] [address]

## 2023-07-06 NOTE — ASSESSMENT & PLAN NOTE
Unremarkable exam of adult male. Vaccines up to date. Continue with healthy diet, regular exercise. Continue regular eye exams, overdue for dental ppx.

## 2023-07-07 RX ORDER — INSULIN LISPRO 100 [IU]/ML
INJECTION, SOLUTION INTRAVENOUS; SUBCUTANEOUS
Qty: 15 ML | Refills: 0 | OUTPATIENT
Start: 2023-07-07

## 2023-08-16 DIAGNOSIS — I10 ESSENTIAL HYPERTENSION: ICD-10-CM

## 2023-08-17 RX ORDER — LISINOPRIL 5 MG/1
5 TABLET ORAL DAILY
Qty: 90 TABLET | Refills: 0 | Status: SHIPPED | OUTPATIENT
Start: 2023-08-17

## 2023-09-13 NOTE — PROGRESS NOTES
Established Patient Progress Note      Chief Complaint   Patient presents with   • Diabetes Type 1        History of Present Illness:   Nick Carrillo is a 29 y.o. male with type 1 diabetes with long term use of insulin since August 2017. Last seen in the office 10/2022 with Althea Batista. Denies complications of none. Denies recent illness or hospitalizations. Denies recent severe hypoglycemic or severe hyperglycemic episodes. Denies any issues with his current regimen. Home glucose monitoring: are performed regularly with CGM. 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 Overall,doing very well. Had COVID in approx Sept 2022    Current regimen:   Tresiba 16 units daily   Novolog with carb ratio of 1:12 (without activity) or 1:15 (with activity), ISF: 1:40 mg/dL, BG target 110  Modifying based on activity    Nick Carrillo   Device used: Dexcom G6  Home use   Indication: Type 1 Diabetes  More than 72 hours of data was reviewed. Report to be scanned to chart. Date Range:   Analysis of data: 9/1/23-8/14/2023  Average Glucose: 140  SD :  39  Time in Target Range: 82%    Time Above Range: 16% high, 1% very high    Time Below Range: <1% low   Interpretation of data:     Overall very good control.  Some lower BGs with over correction    Ophthal:scheduled for this month, no retinopathy   Pod: today  Plans to get flu and COVID boosters     Has hypertension: Taking lisinopril     Patient Active Problem List   Diagnosis   • Type 1 diabetes mellitus with hypoglycemia ([address])   • Routine general medical examination at a health care facility   • Annual physical exam   • Sinus congestion   • Elevated blood pressure reading without diagnosis of hypertension   • Deviated septum   • LUQ pain   • Chest discomfort   • Belching   • Current use of insulin (HCC)   • Essential hypertension   • Non-healing skin lesion      Past Medical History:   Diagnosis Date   • Allergic     seasonal allergies • Diabetes mellitus ([address] [facility]) 2015    Type 1   • Essential hypertension 6/29/2021   • Visual impairment     wears corrective lenses      Past Surgical History:   Procedure Laterality Date   • NO PAST SURGERIES        Family History   Problem Relation Age of Onset   • Diabetes Mother    • Arthritis Mother    • Substance Abuse Mother    • Hyperlipidemia Father    • Substance Abuse Father    • Anxiety disorder Brother    • No Known Problems Daughter      Social History     Tobacco Use   • Smoking status: Some Days     Types: Cigars   • Smokeless tobacco: Never   Substance Use Topics   • Alcohol use: Yes     Comment: 3-4 beers on 1-2 nights per week     No Known Allergies      Current Outpatient Medications:   •  Continuous Blood Gluc  (Dexcom G7 ) RAVINDER, Use 1 each daily Use daily, Disp: 1 each, Rfl: 1  •  Continuous Blood Gluc Sensor (Dexcom G7 Sensor), Use 1 Device every 10 days, Disp: 3 each, Rfl: 5  •  Glucagon (Baqsimi One Pack) 3 MG/DOSE POWD, Spray as directed for hypoglycemic emerency, Disp: 1 each, Rfl: 2  •  glucose blood (ONE TOUCH ULTRA TEST) test strip, USE TO TEST BLOOD SUGAR 8 TIMES A DAY, Disp: 200 each, Rfl: 0  •  HumaLOG KwikPen 100 units/mL injection pen, INJECT 10 UNITS UNDER THE SKIN UP TO THREE TIMES A DAY WITH CARB COUNTING AS DIRECTED, Disp: 30 mL, Rfl: 1  •  Insulin Pen Needle (BD Pen Needle Estelle 2nd Gen) 32G X 4 MM MISC, Use four times daily, Disp: 200 each, Rfl: 5  •  lisinopril (ZESTRIL) 5 mg tablet, Take 1 tablet (5 mg total) by mouth daily, Disp: 90 tablet, Rfl: 0  •  Tresiba FlexTouch 100 units/mL injection pen, Inject 16 Units under the skin daily, Disp: 15 mL, Rfl: 1    Review of Systems   Constitutional: Negative for fatigue and unexpected weight change. HENT: Negative for voice change. Eyes: Negative for visual disturbance. Musculoskeletal: Negative for gait problem. Neurological: Negative for tremors, weakness and numbness.    Psychiatric/Behavioral: The patient is not nervous/anxious. Physical Exam:  Body mass index is 24.5 kg/m². /82   Pulse 62   Ht 6' 2" (1.88 m)   Wt 86.5 kg (190 lb 12.8 oz)   BMI 24.50 kg/m²    Wt Readings from Last 3 Encounters:   09/14/23 86.5 kg (190 lb 12.8 oz)   07/06/23 85 kg (187 lb 6.4 oz)   10/12/22 83.9 kg (185 lb)       Physical Exam   Gen: appears well-developed and well-nourished. No apparent distress. Head: Normocephalic and atraumatic. Eyes: no stare or proptosis, no periorbital edema  E/N/M nl facies, hearing grossly intact  Neck: range of motion nl. Pulmonary/Chest: breathing  comfortably, no accessory muscle use, effort normal.   Musculoskeletal: moves upper extremities  Neurological: alert and oriented to person, place, and time. No upper ext tremor appreciated  Skin: does not appear diaphoretic, no facial plethora  Psychiatric: normal mood and affect; behavior is normal; no gross lapses in memory, answer questions appropriately    Patient's shoes and socks removed. Right Foot/Ankle   Right Foot Inspection  Skin Exam: skin normal and skin intact. No dry skin, no warmth, no callus, no erythema, no maceration, no abnormal color, no pre-ulcer, no ulcer and no callus. Toe Exam: ROM and strength within normal limits. Sensory   Vibration: intact  Monofilament testing: intact    Vascular  The right DP pulse is 2+. Left Foot/Ankle  Left Foot Inspection  Skin Exam: skin normal and skin intact. No dry skin, no warmth, no erythema, no maceration, normal color, no pre-ulcer, no ulcer and no callus. Toe Exam: ROM and strength within normal limits. Sensory   Vibration: intact  Monofilament testing: intact    Vascular  The left DP pulse is 2+.      Assign Risk Category  No deformity present  No loss of protective sensation  No weak pulses  Risk: 0      Labs:   Lab Results   Component Value Date    HGBA1C 6.7 (A) 09/14/2023    HGBA1C 6.9 (A) 10/12/2022    HGBA1C 6.7 (A) 06/29/2021     Lab Results Component Value Date    CREATININE 1.04 10/14/2022    CREATININE 0.93 12/11/2018    CREATININE 0.93 09/04/2018    BUN 25 10/14/2022    K 4.4 10/14/2022     10/14/2022    CO2 24 10/14/2022     eGFR   Date Value Ref Range Status   10/14/2022 95 ml/min/1.73sq m Final     Lab Results   Component Value Date    HDL 54 10/14/2022    TRIG 80 10/14/2022     Lab Results   Component Value Date    ALT 30 10/14/2022    AST 11 10/14/2022    ALKPHOS 52 10/14/2022     Lab Results   Component Value Date    PCA1UQRYMSRO 2.070 12/11/2018     Lab Results   Component Value Date    FREET4 1.05 12/11/2018       Impression & Plan:    1. Type 1 diabetes mellitus with hypoglycemia and without coma (HCC)  POCT hemoglobin A1c    Continuous Blood Gluc Sensor (Dexcom G7 Sensor)    Continuous Blood Gluc  (Dexcom G7 ) RAVINDER      2. Current use of insulin (HCC)  Continuous Blood Gluc Sensor (Dexcom G7 Sensor)    Continuous Blood Gluc  (Dexcom G7 ) Milli De Santiago    [place] FlexTouch 100 units/mL injection pen    HumaLOG KwikPen 100 units/mL injection pen    Insulin Pen Needle (BD Pen Needle Estelle 2nd Gen) 32G X 4 MM MISC      3. Type 1 diabetes mellitus without complication (HCC)  [place] FlexTouch 100 units/mL injection pen    HumaLOG KwikPen 100 units/mL injection pen    Insulin Pen Needle (BD Pen Needle Estelle 2nd Gen) 32G X 4 MM MISC    Lipid panel Lab Collect Lab Collect    Comprehensive metabolic panel Lab Collect    Albumin / creatinine urine ratio    TSH, 3rd generation Lab Collect          1. Type 1 diabetes: Well controlled and very knowledgeable about his care. Refills sent. Eye exam scheduled. Will send for Dexcom g7 as he is due for refills. Will need yearly labs including Urine AMC, lipids, TSH. A1c done in the office. Plan RTC in 6mos    Discussed with the patient and all questioned fully answered. He will call me if any problems arise.   Salina Hoffman MD

## 2023-09-14 ENCOUNTER — OFFICE VISIT (OUTPATIENT)
Dept: ENDOCRINOLOGY | Facility: CLINIC | Age: 33
End: 2023-09-14
Payer: COMMERCIAL

## 2023-09-14 VITALS
HEIGHT: 74 IN | HEART RATE: 62 BPM | SYSTOLIC BLOOD PRESSURE: 148 MMHG | DIASTOLIC BLOOD PRESSURE: 82 MMHG | WEIGHT: 190.8 LBS | BODY MASS INDEX: 24.49 KG/M2

## 2023-09-14 DIAGNOSIS — E10.9 TYPE 1 DIABETES MELLITUS WITHOUT COMPLICATION (HCC): ICD-10-CM

## 2023-09-14 DIAGNOSIS — Z79.4 CURRENT USE OF INSULIN (HCC): ICD-10-CM

## 2023-09-14 DIAGNOSIS — E10.649 TYPE 1 DIABETES MELLITUS WITH HYPOGLYCEMIA AND WITHOUT COMA (HCC): Primary | ICD-10-CM

## 2023-09-14 LAB — SL AMB POCT HEMOGLOBIN AIC: 6.7 (ref ?–6.5)

## 2023-09-14 PROCEDURE — 95251 CONT GLUC MNTR ANALYSIS I&R: CPT | Performed by: INTERNAL MEDICINE

## 2023-09-14 PROCEDURE — 83036 HEMOGLOBIN GLYCOSYLATED A1C: CPT | Performed by: INTERNAL MEDICINE

## 2023-09-14 PROCEDURE — 99214 OFFICE O/P EST MOD 30 MIN: CPT | Performed by: INTERNAL MEDICINE

## 2023-09-14 RX ORDER — ACYCLOVIR 400 MG/1
1 TABLET ORAL
Qty: 3 EACH | Refills: 5 | Status: SHIPPED | OUTPATIENT
Start: 2023-09-14

## 2023-09-14 RX ORDER — ACYCLOVIR 400 MG/1
1 TABLET ORAL DAILY
Qty: 1 EACH | Refills: 1 | Status: SHIPPED | OUTPATIENT
Start: 2023-09-14

## 2023-09-14 RX ORDER — INSULIN LISPRO 100 [IU]/ML
INJECTION, SOLUTION INTRAVENOUS; SUBCUTANEOUS
Qty: 30 ML | Refills: 1 | Status: SHIPPED | OUTPATIENT
Start: 2023-09-14

## 2023-09-14 RX ORDER — INSULIN DEGLUDEC INJECTION 100 U/ML
16 INJECTION, SOLUTION SUBCUTANEOUS DAILY
Qty: 15 ML | Refills: 1 | Status: SHIPPED | OUTPATIENT
Start: 2023-09-14

## 2023-09-14 RX ORDER — PEN NEEDLE, DIABETIC 32GX 5/32"
NEEDLE, DISPOSABLE MISCELLANEOUS
Qty: 200 EACH | Refills: 5 | Status: SHIPPED | OUTPATIENT
Start: 2023-09-14

## 2023-11-13 ENCOUNTER — CLINICAL SUPPORT (OUTPATIENT)
Dept: FAMILY MEDICINE CLINIC | Facility: CLINIC | Age: 33
End: 2023-11-13

## 2023-11-13 ENCOUNTER — TELEPHONE (OUTPATIENT)
Dept: FAMILY MEDICINE CLINIC | Facility: CLINIC | Age: 33
End: 2023-11-13

## 2023-11-13 VITALS — SYSTOLIC BLOOD PRESSURE: 140 MMHG | DIASTOLIC BLOOD PRESSURE: 72 MMHG

## 2023-11-13 DIAGNOSIS — I10 ESSENTIAL HYPERTENSION: Primary | ICD-10-CM

## 2023-11-13 NOTE — TELEPHONE ENCOUNTER
Called patient after his visit in the office & speaking with provider, pt informed to increase his lisinopril to 10 mg and follow up with Michelle Knox in a month to see if he's having improvement with his bp.

## 2023-11-13 NOTE — PROGRESS NOTES
Name: Elly Kan      : 1990      MRN: [id number]  Encounter Provider: Elisha Eagle  Encounter Date: 2023   Encounter department: [facility]          Current Outpatient Medications on File Prior to Visit   Medication Sig    Continuous Blood Gluc  (Dexcom G7 ) RAVINDER Use 1 each daily Use daily    Continuous Blood Gluc Sensor (Dexcom G7 Sensor) Use 1 Device every 10 days    Glucagon (Baqsimi One Pack) 3 MG/DOSE POWD Spray as directed for hypoglycemic emerency    glucose blood (ONE TOUCH ULTRA TEST) test strip USE TO TEST BLOOD SUGAR 8 TIMES A DAY    HumaLOG KwikPen 100 units/mL injection pen INJECT 10 UNITS UNDER THE SKIN UP TO THREE TIMES A DAY WITH CARB COUNTING AS DIRECTED    Insulin Pen Needle (BD Pen Needle Estelle 2nd Gen) 32G X 4 MM MISC Use four times daily    lisinopril (ZESTRIL) 5 mg tablet Take 1 tablet (5 mg total) by mouth daily    Tresiba FlexTouch 100 units/mL injection pen Inject 16 Units under the skin daily       Objective     /72 (BP Location: Left arm, Patient Position: Sitting, Cuff Size: Large)       Osiris Robles

## 2023-12-07 DIAGNOSIS — E10.9 TYPE 1 DIABETES MELLITUS WITHOUT COMPLICATION (HCC): ICD-10-CM

## 2023-12-07 DIAGNOSIS — Z79.4 CURRENT USE OF INSULIN (HCC): ICD-10-CM

## 2023-12-08 RX ORDER — INSULIN DEGLUDEC INJECTION 100 U/ML
16 INJECTION, SOLUTION SUBCUTANEOUS DAILY
Qty: 15 ML | Refills: 0 | Status: SHIPPED | OUTPATIENT
Start: 2023-12-08

## 2023-12-13 DIAGNOSIS — I10 ESSENTIAL HYPERTENSION: ICD-10-CM

## 2023-12-13 RX ORDER — LISINOPRIL 5 MG/1
5 TABLET ORAL DAILY
Qty: 30 TABLET | Refills: 5 | Status: SHIPPED | OUTPATIENT
Start: 2023-12-13

## 2024-01-10 DIAGNOSIS — Z79.4 CURRENT USE OF INSULIN (HCC): ICD-10-CM

## 2024-01-10 DIAGNOSIS — E10.9 TYPE 1 DIABETES MELLITUS WITHOUT COMPLICATION (HCC): ICD-10-CM

## 2024-01-10 RX ORDER — INSULIN DEGLUDEC INJECTION 100 U/ML
16 INJECTION, SOLUTION SUBCUTANEOUS DAILY
Qty: 15 ML | Refills: 1 | Status: SHIPPED | OUTPATIENT
Start: 2024-01-10

## 2024-01-10 RX ORDER — INSULIN LISPRO 100 [IU]/ML
INJECTION, SOLUTION INTRAVENOUS; SUBCUTANEOUS
Qty: 30 ML | Refills: 1 | Status: SHIPPED | OUTPATIENT
Start: 2024-01-10

## 2024-02-02 LAB
LEFT EYE DIABETIC RETINOPATHY: NORMAL
RIGHT EYE DIABETIC RETINOPATHY: NORMAL

## 2024-02-21 PROBLEM — Z00.00 ROUTINE GENERAL MEDICAL EXAMINATION AT A HEALTH CARE FACILITY: Status: RESOLVED | Noted: 2018-03-13 | Resolved: 2024-02-21

## 2024-03-07 ENCOUNTER — OFFICE VISIT (OUTPATIENT)
Dept: FAMILY MEDICINE CLINIC | Facility: CLINIC | Age: 34
End: 2024-03-07
Payer: COMMERCIAL

## 2024-03-07 VITALS
TEMPERATURE: 97.8 F | HEIGHT: 74 IN | OXYGEN SATURATION: 99 % | BODY MASS INDEX: 24 KG/M2 | SYSTOLIC BLOOD PRESSURE: 118 MMHG | DIASTOLIC BLOOD PRESSURE: 80 MMHG | HEART RATE: 90 BPM | WEIGHT: 187 LBS | RESPIRATION RATE: 16 BRPM

## 2024-03-07 DIAGNOSIS — I10 ESSENTIAL HYPERTENSION: ICD-10-CM

## 2024-03-07 DIAGNOSIS — R07.9 CHEST PAIN, UNSPECIFIED TYPE: Primary | ICD-10-CM

## 2024-03-07 PROCEDURE — 99214 OFFICE O/P EST MOD 30 MIN: CPT | Performed by: NURSE PRACTITIONER

## 2024-03-07 PROCEDURE — 93000 ELECTROCARDIOGRAM COMPLETE: CPT | Performed by: NURSE PRACTITIONER

## 2024-03-07 RX ORDER — LISINOPRIL 10 MG/1
10 TABLET ORAL DAILY
Qty: 90 TABLET | Refills: 0 | Status: SHIPPED | OUTPATIENT
Start: 2024-03-07 | End: 2024-03-12 | Stop reason: SDUPTHER

## 2024-03-07 NOTE — PROGRESS NOTES
Name: Raymundo Rubio      : 1990      MRN: [id number]  Encounter Provider: MATT Dumont  Encounter Date: 3/7/2024   Encounter department: [facility]    Assessment & Plan     1. Chest pain, unspecified type  Assessment & Plan:  No ischemic changes on EKG.  Symptoms occur with report of indigestion and burping; noted improvement after cutting caffeine yesterday.  Eats an acidic diet.  Advised to reduce acidogenic foods and try pepcid bid.  If symptoms worsen or persist, pt should notify me and will likely refer to GI.    Orders:  -     POCT ECG    2. Essential hypertension  Assessment & Plan:  Blood pressure normal.  Continue lisinopril 10 mg.      Orders:  -     lisinopril (ZESTRIL) 10 mg tablet; Take 1 tablet (10 mg total) by mouth daily           Subjective      Pt is a 34 y.o. y/o male who is seen today for evaluation of high blood pressure.  Past medical history of diabetes type 1, HTN.  He states starting last week he was having a lot of indigestion; he can't lay flat within 2 hours of eating.  He reports a lot of burping and a burning sensation.  He was starting to get some discomfort left chest below his collarbone.  He notices symptoms after he eats and yesterday he cut out caffeine.  His nose has also been stuffy; not taking anything for that and not experiencing any post-nasal drip or sore throat.  He checked his blood pressure at home on Thursday and a few days since then and blood pressure was elevated whenever he checked it, which has him concerned.  Today he woke up and blood pressure was 120/77, 122/79.  He says he never felt that pain was his heart, there were no palpitations.      Review of Systems   Constitutional:  Negative for appetite change, chills, fatigue and fever.   Respiratory:  Negative for shortness of breath.    Cardiovascular:  Positive for chest pain (burning chest pain after meals). Negative for palpitations.   Gastrointestinal:  Negative  "for abdominal pain, constipation, diarrhea, nausea and vomiting.        Indigestion   Neurological:  Negative for light-headedness and headaches.   Psychiatric/Behavioral:  Negative for sleep disturbance.        Current Outpatient Medications on File Prior to Visit   Medication Sig    Continuous Blood Gluc  (Dexcom G7 ) RAVINDER Use 1 each daily Use daily    Continuous Blood Gluc Sensor (Dexcom G7 Sensor) Use 1 Device every 10 days    Glucagon (Baqsimi One Pack) 3 MG/DOSE POWD Spray as directed for hypoglycemic emerency    glucose blood (ONE TOUCH ULTRA TEST) test strip USE TO TEST BLOOD SUGAR 8 TIMES A DAY    HumaLOG KwikPen 100 units/mL injection pen INJECT 10 UNITS UNDER THE SKIN UP TO 3 TIMES DAILY WITH CARB COUNTING AS DIRECTED BY MD.    Insulin Pen Needle (BD Pen Needle Estelle 2nd Gen) 32G X 4 MM MISC Use four times daily    Tresiba FlexTouch 100 units/mL injection pen INJECT 16 UNITS UNDER THE SKIN DAILY    [DISCONTINUED] lisinopril (ZESTRIL) 5 mg tablet TAKE 1 TABLET BY MOUTH EVERY DAY (Patient taking differently: Take 10 mg by mouth daily)       Objective     /80   Pulse 90   Temp 97.8 °F (36.6 °C) (Tympanic)   Resp 16   Ht 6' 2\" (1.88 m)   Wt 84.8 kg (187 lb)   SpO2 99%   BMI 24.01 kg/m²     Physical Exam  Vitals reviewed.   Constitutional:       General: He is awake. He is not in acute distress.     Appearance: Normal appearance. He is well-developed and well-groomed. He is not ill-appearing.   Eyes:      General: Lids are normal.      Conjunctiva/sclera: Conjunctivae normal.   Cardiovascular:      Rate and Rhythm: Normal rate and regular rhythm.      Pulses: Normal pulses.      Heart sounds: Normal heart sounds. No murmur heard.  Pulmonary:      Effort: Pulmonary effort is normal. No respiratory distress.      Breath sounds: Normal breath sounds.   Abdominal:      General: Abdomen is flat. Bowel sounds are normal.      Palpations: Abdomen is soft.      Tenderness: There is no " abdominal tenderness.   Musculoskeletal:      Right lower leg: No edema.      Left lower leg: No edema.   Skin:     General: Skin is dry.   Neurological:      Mental Status: He is alert and oriented to person, place, and time.   Psychiatric:         Attention and Perception: Attention normal.         Mood and Affect: Mood normal.         Speech: Speech normal.         Behavior: Behavior normal. Behavior is cooperative.         Thought Content: Thought content normal.         Cognition and Memory: Cognition normal.         Judgment: Judgment normal.       MATT Dumont

## 2024-03-08 NOTE — ASSESSMENT & PLAN NOTE
No ischemic changes on EKG.  Symptoms occur with report of indigestion and burping; noted improvement after cutting caffeine yesterday.  Eats an acidic diet.  Advised to reduce acidogenic foods and try pepcid bid.  If symptoms worsen or persist, pt should notify me and will likely refer to GI.

## 2024-03-12 DIAGNOSIS — I10 ESSENTIAL HYPERTENSION: ICD-10-CM

## 2024-03-12 RX ORDER — LISINOPRIL 10 MG/1
10 TABLET ORAL DAILY
Qty: 90 TABLET | Refills: 1 | Status: SHIPPED | OUTPATIENT
Start: 2024-03-12

## 2024-03-12 NOTE — TELEPHONE ENCOUNTER
Patient needs updated blood work and has previously placed orders. Please contact patient to go for labs.

## 2024-03-26 ENCOUNTER — OFFICE VISIT (OUTPATIENT)
Dept: ENDOCRINOLOGY | Facility: CLINIC | Age: 34
End: 2024-03-26
Payer: COMMERCIAL

## 2024-03-26 VITALS
DIASTOLIC BLOOD PRESSURE: 80 MMHG | HEART RATE: 68 BPM | SYSTOLIC BLOOD PRESSURE: 130 MMHG | OXYGEN SATURATION: 98 % | HEIGHT: 74 IN | BODY MASS INDEX: 24.2 KG/M2 | WEIGHT: 188.6 LBS

## 2024-03-26 DIAGNOSIS — Z79.4 CURRENT USE OF INSULIN (HCC): ICD-10-CM

## 2024-03-26 DIAGNOSIS — E10.649 TYPE 1 DIABETES MELLITUS WITH HYPOGLYCEMIA AND WITHOUT COMA (HCC): Primary | ICD-10-CM

## 2024-03-26 DIAGNOSIS — E10.9 TYPE 1 DIABETES MELLITUS WITHOUT COMPLICATION (HCC): ICD-10-CM

## 2024-03-26 DIAGNOSIS — I10 ESSENTIAL HYPERTENSION: ICD-10-CM

## 2024-03-26 LAB — SL AMB POCT HEMOGLOBIN AIC: 6.9 (ref ?–6.5)

## 2024-03-26 PROCEDURE — 99214 OFFICE O/P EST MOD 30 MIN: CPT

## 2024-03-26 PROCEDURE — 95251 CONT GLUC MNTR ANALYSIS I&R: CPT

## 2024-03-26 PROCEDURE — 83036 HEMOGLOBIN GLYCOSYLATED A1C: CPT

## 2024-03-26 RX ORDER — INSULIN LISPRO 100 [IU]/ML
INJECTION, SOLUTION INTRAVENOUS; SUBCUTANEOUS
Qty: 45 ML | Refills: 1 | Status: SHIPPED | OUTPATIENT
Start: 2024-03-26

## 2024-03-26 NOTE — PROGRESS NOTES
Established Patient Progress Note    CC: Follow up for type 1 diabetes mellitus     Impression & Plan:    Problem List Items Addressed This Visit       Type 1 diabetes mellitus with hypoglycemia (HCC) - Primary     Stable. CGM report reviewed. Blood sugars are higher on weekends due to snacking and eating out. He also keeps blood sugars are on the higher side when he is running- these are contributing to GMI of 7.1%.     He will work on his diet.    Continue current medication regimen.    He is due for lab work which has been ordered. Results will be reviewed once available    Lab Results   Component Value Date    HGBA1C 6.9 (A) 03/26/2024            Relevant Medications    HumaLOG KwikPen 100 units/mL injection pen    Current use of insulin (HCC)    Relevant Medications    HumaLOG KwikPen 100 units/mL injection pen    Essential hypertension     Stable in office. Continue current regimen          Other Visit Diagnoses       Type 1 diabetes mellitus without complication (HCC)        Relevant Medications    HumaLOG KwikPen 100 units/mL injection pen    Other Relevant Orders    POCT hemoglobin A1c (Completed)    Hemoglobin A1C            Orders Placed This Encounter   Procedures    Hemoglobin A1C     Standing Status:   Future     Standing Expiration Date:   3/26/2025    POCT hemoglobin A1c       History of Present Illness:   Raymundo Rubio is a 34 y.o. male with type 1 diabetes with long term use of insulin since August 2017. Reports complications of none. POCT A1C was 6.9. Home glucose monitoring: are performed regularly with CGM.     Hypoglycemia: yes- will sometimes give extra insulin for high blood sugar if not coming down fast enough, then will have hypoglycemia.  Exercises regularly. Runs marathons. Reports dietary excursions on weekends. Is not interested in pump therapy- happy on injections.    Current regimen:   Tresiba 16 units daily   Novolog with carb ratio of 1:10, ISF: 1:30 mg/dL, BG target 110      Raymundo Rubio   Device used: Dexcom G7  Home use   Indication: Type 1 Diabetes  More than 72 hours of data was reviewed. Report to be scanned to chart.   Date Range: March 12 to March 25, 2024  Analysis of data:   Average Glucose: 158  SD : 53  Time in Target Range: 72%    Time Above Range: 21% high, 6% very high    Time Below Range: <1% low; <1% very low  Interpretation of data: hyperglycemia variable, but noted on weekends/late night due to snacking     Last Eye Exam: UTD  Last Foot Exam: UTD     Has hypertension: Taking lisinopril     He has no complaints today. He is a new dad to 9 month old.    Patient Active Problem List   Diagnosis    Type 1 diabetes mellitus with hypoglycemia (HCC)    Annual physical exam    Sinus congestion    Elevated blood pressure reading without diagnosis of hypertension    Deviated septum    LUQ pain    Chest discomfort    Belching    Current use of insulin (HCC)    Essential hypertension    Non-healing skin lesion    Chest pain      Past Medical History:   Diagnosis Date    Allergic     seasonal allergies    Diabetes mellitus (HCC) 2015    Type 1    Essential hypertension 6/29/2021    Visual impairment     wears corrective lenses      Past Surgical History:   Procedure Laterality Date    NO PAST SURGERIES        Family History   Problem Relation Age of Onset    Diabetes Mother     Arthritis Mother     Substance Abuse Mother     Hyperlipidemia Father     Substance Abuse Father     Anxiety disorder Brother     No Known Problems Daughter      Social History     Tobacco Use    Smoking status: Some Days     Types: Cigars     Passive exposure: Never    Smokeless tobacco: Never   Substance Use Topics    Alcohol use: Yes     Comment: 3-4 beers on 1-2 nights per week     No Known Allergies      Current Outpatient Medications:     Continuous Blood Gluc  (Dexcom G7 ) RAVINDER, Use 1 each daily Use daily, Disp: 1 each, Rfl: 1    Continuous Blood Gluc Sensor (Dexcom G7 Sensor),  "Use 1 Device every 10 days, Disp: 3 each, Rfl: 5    Glucagon (Baqsimi One Pack) 3 MG/DOSE POWD, Spray as directed for hypoglycemic emerency, Disp: 1 each, Rfl: 2    glucose blood (ONE TOUCH ULTRA TEST) test strip, USE TO TEST BLOOD SUGAR 8 TIMES A DAY, Disp: 200 each, Rfl: 0    HumaLOG KwikPen 100 units/mL injection pen, INJECT 10 UNITS UNDER THE SKIN UP TO 3 TIMES DAILY WITH CARB COUNTING AS DIRECTED BY MD., Disp: 45 mL, Rfl: 1    Insulin Pen Needle (BD Pen Needle Estelle 2nd Gen) 32G X 4 MM MISC, Use four times daily, Disp: 200 each, Rfl: 5    lisinopril (ZESTRIL) 10 mg tablet, Take 1 tablet (10 mg total) by mouth daily, Disp: 90 tablet, Rfl: 1    Tresiba FlexTouch 100 units/mL injection pen, INJECT 16 UNITS UNDER THE SKIN DAILY, Disp: 15 mL, Rfl: 1    Review of Systems   Constitutional:  Negative for chills and fever.   HENT:  Negative for ear pain and sore throat.    Eyes:  Negative for pain and visual disturbance.   Respiratory:  Negative for cough and shortness of breath.    Cardiovascular:  Negative for chest pain and palpitations.   Gastrointestinal:  Negative for abdominal pain and vomiting.   Genitourinary:  Negative for dysuria and hematuria.   Musculoskeletal:  Negative for arthralgias and back pain.   Skin:  Negative for color change and rash.   Neurological:  Negative for seizures and syncope.   All other systems reviewed and are negative.      Physical Exam:  Body mass index is 24.21 kg/m².  /80   Pulse 68   Ht 6' 2\" (1.88 m)   Wt 85.5 kg (188 lb 9.6 oz)   SpO2 98%   BMI 24.21 kg/m²    Wt Readings from Last 3 Encounters:   03/26/24 85.5 kg (188 lb 9.6 oz)   03/07/24 84.8 kg (187 lb)   09/14/23 86.5 kg (190 lb 12.8 oz)       Physical Exam  Vitals reviewed.   Constitutional:       Appearance: Normal appearance.   HENT:      Head: Normocephalic and atraumatic.   Cardiovascular:      Rate and Rhythm: Normal rate.   Pulmonary:      Effort: Pulmonary effort is normal.   Neurological:      Mental " Status: He is alert and oriented to person, place, and time.   Psychiatric:         Mood and Affect: Mood normal.         Behavior: Behavior normal.       Diabetic Foot Exam    Labs:   Lab Results   Component Value Date    HGBA1C 6.9 (A) 03/26/2024    HGBA1C 6.7 (A) 09/14/2023    HGBA1C 6.9 (A) 10/12/2022     Lab Results   Component Value Date    CREATININE 1.04 10/14/2022    CREATININE 0.93 12/11/2018    CREATININE 0.93 09/04/2018    BUN 25 10/14/2022    K 4.4 10/14/2022     10/14/2022    CO2 24 10/14/2022     eGFR   Date Value Ref Range Status   10/14/2022 95 ml/min/1.73sq m Final     Lab Results   Component Value Date    HDL 54 10/14/2022    TRIG 80 10/14/2022     Lab Results   Component Value Date    ALT 30 10/14/2022    AST 11 10/14/2022    ALKPHOS 52 10/14/2022     Lab Results   Component Value Date    XRZ5JHLTANFV 2.070 12/11/2018     Lab Results   Component Value Date    FREET4 1.05 12/11/2018         There are no Patient Instructions on file for this visit.      Discussed with the patient and all questioned fully answered. He will call me if any problems arise.

## 2024-03-26 NOTE — ASSESSMENT & PLAN NOTE
Stable. CGM report reviewed. Blood sugars are higher on weekends due to snacking and eating out. He also keeps blood sugars are on the higher side when he is running- these are contributing to GMI of 7.1%.     He will work on his diet.    Continue current medication regimen.    He is due for lab work which has been ordered. Results will be reviewed once available    Lab Results   Component Value Date    HGBA1C 6.9 (A) 03/26/2024

## 2024-03-28 DIAGNOSIS — E10.649 TYPE 1 DIABETES MELLITUS WITH HYPOGLYCEMIA AND WITHOUT COMA (HCC): ICD-10-CM

## 2024-03-28 DIAGNOSIS — Z79.4 CURRENT USE OF INSULIN (HCC): ICD-10-CM

## 2024-03-28 RX ORDER — ACYCLOVIR 400 MG/1
1 TABLET ORAL
Qty: 3 EACH | Refills: 0 | Status: SHIPPED | OUTPATIENT
Start: 2024-03-28

## 2024-04-24 DIAGNOSIS — Z79.4 CURRENT USE OF INSULIN (HCC): ICD-10-CM

## 2024-04-24 DIAGNOSIS — E10.649 TYPE 1 DIABETES MELLITUS WITH HYPOGLYCEMIA AND WITHOUT COMA (HCC): ICD-10-CM

## 2024-04-25 RX ORDER — ACYCLOVIR 400 MG/1
1 TABLET ORAL
Qty: 3 EACH | Refills: 5 | Status: SHIPPED | OUTPATIENT
Start: 2024-04-25

## 2024-06-27 DIAGNOSIS — E10.9 TYPE 1 DIABETES MELLITUS WITHOUT COMPLICATION (HCC): ICD-10-CM

## 2024-06-27 DIAGNOSIS — Z79.4 CURRENT USE OF INSULIN (HCC): ICD-10-CM

## 2024-06-27 RX ORDER — INSULIN LISPRO 100 [IU]/ML
INJECTION, SOLUTION INTRAVENOUS; SUBCUTANEOUS
Qty: 30 ML | Refills: 1 | Status: SHIPPED | OUTPATIENT
Start: 2024-06-27

## 2024-07-11 DIAGNOSIS — Z79.4 CURRENT USE OF INSULIN (HCC): ICD-10-CM

## 2024-07-11 DIAGNOSIS — E10.9 TYPE 1 DIABETES MELLITUS WITHOUT COMPLICATION (HCC): ICD-10-CM

## 2024-07-11 RX ORDER — INSULIN DEGLUDEC 100 U/ML
16 INJECTION, SOLUTION SUBCUTANEOUS DAILY
Qty: 15 ML | Refills: 1 | Status: SHIPPED | OUTPATIENT
Start: 2024-07-11

## 2024-08-29 DIAGNOSIS — I10 ESSENTIAL HYPERTENSION: ICD-10-CM

## 2024-08-29 RX ORDER — LISINOPRIL 10 MG/1
10 TABLET ORAL DAILY
Qty: 90 TABLET | Refills: 1 | Status: SHIPPED | OUTPATIENT
Start: 2024-08-29

## 2024-08-30 ENCOUNTER — APPOINTMENT (OUTPATIENT)
Dept: LAB | Facility: CLINIC | Age: 34
End: 2024-08-30
Payer: COMMERCIAL

## 2024-08-30 ENCOUNTER — OFFICE VISIT (OUTPATIENT)
Dept: FAMILY MEDICINE CLINIC | Facility: CLINIC | Age: 34
End: 2024-08-30
Payer: COMMERCIAL

## 2024-08-30 VITALS
BODY MASS INDEX: 24.36 KG/M2 | RESPIRATION RATE: 16 BRPM | SYSTOLIC BLOOD PRESSURE: 112 MMHG | OXYGEN SATURATION: 100 % | HEIGHT: 74 IN | DIASTOLIC BLOOD PRESSURE: 72 MMHG | TEMPERATURE: 97.5 F | HEART RATE: 100 BPM | WEIGHT: 189.8 LBS

## 2024-08-30 DIAGNOSIS — Z00.00 ANNUAL PHYSICAL EXAM: Primary | ICD-10-CM

## 2024-08-30 DIAGNOSIS — E10.649 TYPE 1 DIABETES MELLITUS WITH HYPOGLYCEMIA AND WITHOUT COMA (HCC): ICD-10-CM

## 2024-08-30 DIAGNOSIS — E10.9 TYPE 1 DIABETES MELLITUS WITHOUT COMPLICATION (HCC): ICD-10-CM

## 2024-08-30 DIAGNOSIS — I10 ESSENTIAL HYPERTENSION: ICD-10-CM

## 2024-08-30 PROBLEM — R03.0 ELEVATED BLOOD PRESSURE READING WITHOUT DIAGNOSIS OF HYPERTENSION: Status: RESOLVED | Noted: 2018-03-13 | Resolved: 2024-08-30

## 2024-08-30 PROBLEM — R09.81 SINUS CONGESTION: Status: RESOLVED | Noted: 2018-03-13 | Resolved: 2024-08-30

## 2024-08-30 LAB
ALBUMIN SERPL BCG-MCNC: 4.4 G/DL (ref 3.5–5)
ALP SERPL-CCNC: 48 U/L (ref 34–104)
ALT SERPL W P-5'-P-CCNC: 18 U/L (ref 7–52)
ANION GAP SERPL CALCULATED.3IONS-SCNC: 9 MMOL/L (ref 4–13)
AST SERPL W P-5'-P-CCNC: 14 U/L (ref 13–39)
BILIRUB SERPL-MCNC: 1.2 MG/DL (ref 0.2–1)
BUN SERPL-MCNC: 24 MG/DL (ref 5–25)
CALCIUM SERPL-MCNC: 9.8 MG/DL (ref 8.4–10.2)
CHLORIDE SERPL-SCNC: 102 MMOL/L (ref 96–108)
CHOLEST SERPL-MCNC: 220 MG/DL
CO2 SERPL-SCNC: 26 MMOL/L (ref 21–32)
CREAT SERPL-MCNC: 0.92 MG/DL (ref 0.6–1.3)
CREAT UR-MCNC: 201.8 MG/DL
EST. AVERAGE GLUCOSE BLD GHB EST-MCNC: 157 MG/DL
GFR SERPL CREATININE-BSD FRML MDRD: 109 ML/MIN/1.73SQ M
GLUCOSE P FAST SERPL-MCNC: 162 MG/DL (ref 65–99)
HBA1C MFR BLD: 7.1 %
HDLC SERPL-MCNC: 56 MG/DL
LDLC SERPL CALC-MCNC: 149 MG/DL (ref 0–100)
MICROALBUMIN UR-MCNC: <7 MG/L
NONHDLC SERPL-MCNC: 164 MG/DL
POTASSIUM SERPL-SCNC: 4.5 MMOL/L (ref 3.5–5.3)
PROT SERPL-MCNC: 7.1 G/DL (ref 6.4–8.4)
SODIUM SERPL-SCNC: 137 MMOL/L (ref 135–147)
TRIGL SERPL-MCNC: 75 MG/DL
TSH SERPL DL<=0.05 MIU/L-ACNC: 2.46 UIU/ML (ref 0.45–4.5)

## 2024-08-30 PROCEDURE — 80061 LIPID PANEL: CPT

## 2024-08-30 PROCEDURE — 3051F HG A1C>EQUAL 7.0%<8.0%: CPT | Performed by: NURSE PRACTITIONER

## 2024-08-30 PROCEDURE — 36415 COLL VENOUS BLD VENIPUNCTURE: CPT

## 2024-08-30 PROCEDURE — 82043 UR ALBUMIN QUANTITATIVE: CPT

## 2024-08-30 PROCEDURE — 83036 HEMOGLOBIN GLYCOSYLATED A1C: CPT

## 2024-08-30 PROCEDURE — 80053 COMPREHEN METABOLIC PANEL: CPT

## 2024-08-30 PROCEDURE — 99395 PREV VISIT EST AGE 18-39: CPT | Performed by: NURSE PRACTITIONER

## 2024-08-30 PROCEDURE — 82570 ASSAY OF URINE CREATININE: CPT

## 2024-08-30 PROCEDURE — 84443 ASSAY THYROID STIM HORMONE: CPT

## 2024-08-30 NOTE — PROGRESS NOTES
Adult Annual Physical  Name: Raymundo Rubio      : 1990      MRN: [id number]  Encounter Provider: MATT Dumont  Encounter Date: 2024   Encounter department: [facility]    Assessment & Plan   1. Annual physical exam  Assessment & Plan:  Normal exam of adult male.  Vaccines up to date, due for dental exam.  Reinforce healthy diet and regular exercise.    He is a healthy bmi    2. Essential hypertension  Assessment & Plan:  Stable with current management., continue lisinopril.  Due for metabolic panel, ordered by endocrinology and will get drawn this AM.  Follow up 6 months.  3. Type 1 diabetes mellitus with hypoglycemia and without coma (HCC)  Assessment & Plan:  Stable with current management., managed by endocrinology.  Due for labs.  Will get this drawn today.  Endocrinology does foot exam and will do at upcoming vist per pt.  Eye exams annually.  Continue insulin and cgm use.  Lab Results   Component Value Date    HGBA1C 6.9 (A) 2024       Immunizations and preventive care screenings were discussed with patient today. Appropriate education was printed on patient's after visit summary.    Counseling:  Dental Health: discussed importance of regular tooth brushing, flossing, and dental visits.  Injury prevention: discussed safety/seat belts, safety helmets, smoke detectors, carbon dioxide detectors, and smoking near bedding or upholstery.  Exercise: the importance of regular exercise/physical activity was discussed. Recommend exercise 3-5 times per week for at least 30 minutes.          History of Present Illness     Adult Annual Physical:  Patient presents for annual physical.     Diet and Physical Activity:  - Diet/Nutrition: well balanced diet and diabetic diet.  - Exercise: vigorous cardiovascular exercise, 1-2 times a week on average and walking.    General Health:  - Sleep:. about 7 hours on average  - Hearing: normal hearing bilateral ears.  - Vision:  "wears glasses and contacts and goes for regular eye exams.  - Dental: no dental visits for > 1 year.     Health:    - Urinary symptoms: none.     Review of Systems   Constitutional:  Negative for activity change, appetite change, chills, fatigue, fever and unexpected weight change.   HENT:  Negative for hearing loss.    Eyes:  Negative for visual disturbance.   Respiratory:  Negative for chest tightness and shortness of breath.    Cardiovascular:  Negative for chest pain, palpitations and leg swelling.   Gastrointestinal:  Negative for abdominal pain, constipation, diarrhea, nausea and vomiting.   Genitourinary:  Negative for dysuria and frequency.   Musculoskeletal:  Negative for arthralgias and myalgias.   Allergic/Immunologic: Negative for environmental allergies.   Neurological:  Negative for dizziness, weakness, light-headedness, numbness and headaches.   Psychiatric/Behavioral:  Negative for dysphoric mood and sleep disturbance. The patient is not nervous/anxious.          Objective     /72 (BP Location: Left arm, Patient Position: Sitting, Cuff Size: Standard)   Pulse 100   Temp 97.5 °F (36.4 °C) (Tympanic)   Resp 16   Ht 6' 2\" (1.88 m)   Wt 86.1 kg (189 lb 12.8 oz)   SpO2 100%   BMI 24.37 kg/m²     Physical Exam  Vitals reviewed.   Constitutional:       General: He is awake. He is not in acute distress.     Appearance: Normal appearance. He is well-developed and well-groomed. He is not ill-appearing.   HENT:      Head: Normocephalic and atraumatic.      Right Ear: Hearing, tympanic membrane, ear canal and external ear normal.      Left Ear: Hearing, tympanic membrane, ear canal and external ear normal.      Nose: Nose normal.      Mouth/Throat:      Lips: Pink.      Mouth: Mucous membranes are moist.      Pharynx: Oropharynx is clear.   Eyes:      General: Lids are normal.      Conjunctiva/sclera: Conjunctivae normal.      Pupils: Pupils are equal, round, and reactive to light.   Neck:      " Thyroid: No thyromegaly.      Vascular: Normal carotid pulses. No carotid bruit or JVD.   Cardiovascular:      Rate and Rhythm: Normal rate and regular rhythm.      Pulses: Normal pulses.      Heart sounds: Normal heart sounds. No murmur heard.  Pulmonary:      Effort: Pulmonary effort is normal.      Breath sounds: Normal breath sounds.   Abdominal:      General: Abdomen is flat. Bowel sounds are normal.      Palpations: Abdomen is soft.      Tenderness: There is no abdominal tenderness.   Musculoskeletal:         General: Normal range of motion.      Cervical back: Normal range of motion.      Right lower leg: No edema.      Left lower leg: No edema.   Lymphadenopathy:      Cervical: No cervical adenopathy.   Skin:     General: Skin is warm and dry.      Capillary Refill: Capillary refill takes less than 2 seconds.   Neurological:      Mental Status: He is alert and oriented to person, place, and time.      Motor: Motor function is intact.   Psychiatric:         Attention and Perception: Attention normal.         Mood and Affect: Mood normal.         Speech: Speech normal.         Behavior: Behavior normal. Behavior is cooperative.         Thought Content: Thought content normal.         Cognition and Memory: Cognition normal.         Judgment: Judgment normal.

## 2024-08-30 NOTE — PATIENT INSTRUCTIONS
"Patient Education     Routine physical for adults   The Basics   Written by the doctors and editors at [facility]   What is a physical? -- A physical is a routine visit, or \"check-up,\" with your doctor. You might also hear it called a \"wellness visit\" or \"preventive visit.\"  During each visit, the doctor will:   Ask about your physical and mental health   Ask about your habits, behaviors, and lifestyle   Do an exam   Give you vaccines if needed   Talk to you about any medicines you take   Give advice about your health   Answer your questions  Getting regular check-ups is an important part of taking care of your health. It can help your doctor find and treat any problems you have. But it's also important for preventing health problems.  A routine physical is different from a \"sick visit.\" A sick visit is when you see a doctor because of a health concern or problem. Since physicals are scheduled ahead of time, you can think about what you want to ask the doctor.  How often should I get a physical? -- It depends on your age and health. In general, for people age 21 years and older:   If you are younger than 50 years, you might be able to get a physical every 3 years.   If you are 50 years or older, your doctor might recommend a physical every year.  If you have an ongoing health condition, like diabetes or high blood pressure, your doctor will probably want to see you more often.  What happens during a physical? -- In general, each visit will include:   Physical exam - The doctor or nurse will check your height, weight, heart rate, and blood pressure. They will also look at your eyes and ears. They will ask about how you are feeling and whether you have any symptoms that bother you.   Medicines - It's a good idea to bring a list of all the medicines you take to each doctor visit. Your doctor will talk to you about your medicines and answer any questions. Tell them if you are having any side effects that bother you. You " "should also tell them if you are having trouble paying for any of your medicines.   Habits and behaviors - This includes:   Your diet   Your exercise habits   Whether you smoke, drink alcohol, or use drugs   Whether you are sexually active   Whether you feel safe at home  Your doctor will talk to you about things you can do to improve your health and lower your risk of health problems. They will also offer help and support. For example, if you want to quit smoking, they can give you advice and might prescribe medicines. If you want to improve your diet or get more physical activity, they can help you with this, too.   Lab tests, if needed - The tests you get will depend on your age and situation. For example, your doctor might want to check your:   Cholesterol   Blood sugar   Iron level   Vaccines - The recommended vaccines will depend on your age, health, and what vaccines you already had. Vaccines are very important because they can prevent certain serious or deadly infections.   Discussion of screening - \"Screening\" means checking for diseases or other health problems before they cause symptoms. Your doctor can recommend screening based on your age, risk, and preferences. This might include tests to check for:   Cancer, such as breast, prostate, cervical, ovarian, colorectal, prostate, lung, or skin cancer   Sexually transmitted infections, such as chlamydia and gonorrhea   Mental health conditions like depression and anxiety  Your doctor will talk to you about the different types of screening tests. They can help you decide which screenings to have. They can also explain what the results might mean.   Answering questions - The physical is a good time to ask the doctor or nurse questions about your health. If needed, they can refer you to other doctors or specialists, too.  Adults older than 65 years often need other care, too. As you get older, your doctor will talk to you about:   How to prevent falling at " home   Hearing or vision tests   Memory testing   How to take your medicines safely   Making sure that you have the help and support you need at home  All topics are updated as new evidence becomes available and our peer review process is complete.  This topic retrieved from [employer] on: May 02, 2024.  Topic 450761 Version 1.0  Release: 32.4.3 - C32.122  © 2024 UpToDate, Inc. and/or its affiliates. All rights reserved.  Consumer Information Use and Disclaimer   Disclaimer: This generalized information is a limited summary of diagnosis, treatment, and/or medication information. It is not meant to be comprehensive and should be used as a tool to help the user understand and/or assess potential diagnostic and treatment options. It does NOT include all information about conditions, treatments, medications, side effects, or risks that may apply to a specific patient. It is not intended to be medical advice or a substitute for the medical advice, diagnosis, or treatment of a health care provider based on the health care provider's examination and assessment of a patient's specific and unique circumstances. Patients must speak with a health care provider for complete information about their health, medical questions, and treatment options, including any risks or benefits regarding use of medications. This information does not endorse any treatments or medications as safe, effective, or approved for treating a specific patient. UpToDate, Inc. and its affiliates disclaim any warranty or liability relating to this information or the use thereof.The use of this information is governed by the Terms of Use, available at https://www.woltersInporiauwer.com/en/know/clinical-effectiveness-terms. 2024© UpToDate, Inc. and its affiliates and/or licensors. All rights reserved.  Copyright   © 2024 UpToDate, Inc. and/or its affiliates. All rights reserved.

## 2024-10-01 NOTE — ASSESSMENT & PLAN NOTE
Normal exam of adult male.  Vaccines up to date, due for dental exam.  Reinforce healthy diet and regular exercise.    He is a healthy bmi    
Stable with current management., continue lisinopril.  Due for metabolic panel, ordered by endocrinology and will get drawn this AM.  Follow up 6 months.  
Stable with current management., managed by endocrinology.  Due for labs.  Will get this drawn today.  Endocrinology does foot exam and will do at upcoming vist per pt.  Eye exams annually.  Continue insulin and cgm use.  Lab Results   Component Value Date    HGBA1C 6.9 (A) 03/26/2024     
Known

## 2024-10-11 DIAGNOSIS — Z79.4 CURRENT USE OF INSULIN (HCC): ICD-10-CM

## 2024-10-11 DIAGNOSIS — E10.649 TYPE 1 DIABETES MELLITUS WITH HYPOGLYCEMIA AND WITHOUT COMA (HCC): ICD-10-CM

## 2024-10-11 RX ORDER — ACYCLOVIR 400 MG/1
1 TABLET ORAL
Qty: 3 EACH | Refills: 0 | Status: SHIPPED | OUTPATIENT
Start: 2024-10-11

## 2024-10-21 ENCOUNTER — OFFICE VISIT (OUTPATIENT)
Dept: ENDOCRINOLOGY | Facility: CLINIC | Age: 34
End: 2024-10-21
Payer: COMMERCIAL

## 2024-10-21 VITALS
HEART RATE: 59 BPM | BODY MASS INDEX: 23.97 KG/M2 | DIASTOLIC BLOOD PRESSURE: 70 MMHG | WEIGHT: 186.8 LBS | SYSTOLIC BLOOD PRESSURE: 122 MMHG | HEIGHT: 74 IN | OXYGEN SATURATION: 98 %

## 2024-10-21 DIAGNOSIS — E10.9 TYPE 1 DIABETES MELLITUS WITHOUT COMPLICATION (HCC): ICD-10-CM

## 2024-10-21 DIAGNOSIS — E10.649 TYPE 1 DIABETES MELLITUS WITH HYPOGLYCEMIA AND WITHOUT COMA (HCC): ICD-10-CM

## 2024-10-21 DIAGNOSIS — Z79.4 CURRENT USE OF INSULIN (HCC): Primary | ICD-10-CM

## 2024-10-21 PROCEDURE — 95251 CONT GLUC MNTR ANALYSIS I&R: CPT | Performed by: INTERNAL MEDICINE

## 2024-10-21 PROCEDURE — 99214 OFFICE O/P EST MOD 30 MIN: CPT | Performed by: INTERNAL MEDICINE

## 2024-10-21 RX ORDER — PEN NEEDLE, DIABETIC 32GX 5/32"
NEEDLE, DISPOSABLE MISCELLANEOUS
Qty: 200 EACH | Refills: 5 | Status: SHIPPED | OUTPATIENT
Start: 2024-10-21

## 2024-10-21 RX ORDER — GLUCAGON 3 MG/1
POWDER NASAL
Qty: 1 EACH | Refills: 2 | Status: SHIPPED | OUTPATIENT
Start: 2024-10-21

## 2024-10-21 NOTE — ASSESSMENT & PLAN NOTE
Lab Results   Component Value Date    HGBA1C 7.1 (H) 08/30/2024       Overall doing very well on Tresiba 16units daily and novolog with carb counting and correction factors as noted. UTD on eye exam.

## 2024-10-21 NOTE — PROGRESS NOTES
Established Patient Progress Note      Chief Complaint   Patient presents with    Diabetes Type 1        History of Present Illness:   Raymundo Rubio is a 32 y.o. male with type 1 diabetes with long term use of insulin since August 2017. Last seen in the office 3/2023 with Asuncion GUTIERREZ in April 2024    Denies complications of none. Denies recent illness or hospitalizations. Denies recent severe hypoglycemic or severe hyperglycemic episodes. Denies any issues with his current regimen. Home glucose monitoring: are performed regularly with CGM.    Overall,doing very well. He recently was not exercising as much nad was more lax on his diet, but has already made changes to improve this.     Had COVID in approx Sept 2022    Current regimen:   Tresiba 16 units daily   Novolog with carb ratio of 1:12 (without activity) or 1:15 (with activity), ISF: 1:40 mg/dL, BG target 110  Modifying based on activity    Raymundo Rubio   Device used: Dexcom G7  Home use   Indication: Type 1 Diabetes  More than 72 hours of data was reviewed. Report to be scanned to chart.   Date Range:   Analysis of data:    Average Glucose: 149  SD :  44  Time in Target Range: 81%    Time Above Range: 14% high, 4% very high    Time Below Range: 1% low   Interpretation of data:     Overall time in range doing well.     Ophthal:  Feb 2024 no retinopathy   Pod: today  Plans to get flu and COVID boosters     Has hypertension: Taking lisinopril     Patient Active Problem List   Diagnosis    Type 1 diabetes mellitus without complication (HCC)    Annual physical exam    Deviated septum    LUQ pain    Chest discomfort    Belching    Current use of insulin (HCC)    Essential hypertension    Non-healing skin lesion    Chest pain      Past Medical History:   Diagnosis Date    Allergic     seasonal allergies    Diabetes mellitus (HCC) 2015    Type 1    Elevated blood pressure reading without diagnosis of hypertension 03/13/2018    Essential hypertension  06/29/2021    Visual impairment     wears corrective lenses      Past Surgical History:   Procedure Laterality Date    NO PAST SURGERIES        Family History   Problem Relation Age of Onset    Diabetes Mother     Arthritis Mother     Substance Abuse Mother     Hyperlipidemia Father     Substance Abuse Father     Anxiety disorder Brother     No Known Problems Daughter      Social History     Tobacco Use    Smoking status: Former     Types: Cigars     Passive exposure: Never    Smokeless tobacco: Never   Substance Use Topics    Alcohol use: Yes     Comment: 3-4 beers on 1-2 nights per week     No Known Allergies      Current Outpatient Medications:     Continuous Blood Gluc  (Dexcom G7 ) RAVINDER, Use 1 each daily Use daily, Disp: 1 each, Rfl: 1    Continuous Glucose Sensor (Dexcom G7 Sensor), USE 1 DEVICE EVERY 10 DAYS, Disp: 3 each, Rfl: 0    Glucagon (Baqsimi One Pack) 3 MG/DOSE POWD, Spray as directed for hypoglycemic emerency, Disp: 1 each, Rfl: 2    glucose blood (ONE TOUCH ULTRA TEST) test strip, USE TO TEST BLOOD SUGAR 8 TIMES A DAY, Disp: 200 each, Rfl: 0    HumaLOG KwikPen 100 units/mL injection pen, INJECT 10 UNITS UNDER THE SKIN UP TO 3 TIMES DAILY WITH CARB COUNTING AS DIRECTED BY MD., Disp: 30 mL, Rfl: 1    Insulin Pen Needle (BD Pen Needle Estelle 2nd Gen) 32G X 4 MM MISC, Use four times daily, Disp: 200 each, Rfl: 5    lisinopril (ZESTRIL) 10 mg tablet, TAKE 1 TABLET BY MOUTH EVERY DAY, Disp: 90 tablet, Rfl: 1    Tresiba FlexTouch 100 units/mL injection pen, INJECT 16 UNITS UNDER THE SKIN DAILY, Disp: 15 mL, Rfl: 1    Review of Systems   Constitutional:  Negative for fatigue and unexpected weight change.   HENT:  Negative for hearing loss.    Eyes:  Negative for visual disturbance.   Endocrine: Negative for polydipsia and polyuria.   Musculoskeletal:  Negative for gait problem.   Neurological:  Negative for tremors, weakness and numbness.   Psychiatric/Behavioral:  The patient is not  "nervous/anxious.        Physical Exam:  Body mass index is 23.98 kg/m².  /70   Pulse 59   Ht 6' 2\" (1.88 m)   Wt 84.7 kg (186 lb 12.8 oz)   SpO2 98%   BMI 23.98 kg/m²    Wt Readings from Last 3 Encounters:   10/21/24 84.7 kg (186 lb 12.8 oz)   08/30/24 86.1 kg (189 lb 12.8 oz)   03/26/24 85.5 kg (188 lb 9.6 oz)       Physical Exam   Gen: appears well-developed and well-nourished. No apparent distress.   Head: Normocephalic and atraumatic.   Eyes: no stare or proptosis, no periorbital edema  E/N/M nl facies, hearing grossly intact  Neck: range of motion nl.   Pulmonary/Chest: breathing  comfortably, no accessory muscle use, effort normal.   Musculoskeletal: moves upper extremities  Neurological: alert and oriented to person, place, and time. No upper ext tremor appreciated  Skin: does not appear diaphoretic, no facial plethora  Psychiatric: normal mood and affect; behavior is normal; no gross lapses in memory, answer questions appropriately        Labs:   Lab Results   Component Value Date    HGBA1C 7.1 (H) 08/30/2024    HGBA1C 6.9 (A) 03/26/2024    HGBA1C 6.7 (A) 09/14/2023     Lab Results   Component Value Date    CREATININE 0.92 08/30/2024    CREATININE 1.04 10/14/2022    CREATININE 0.93 12/11/2018    BUN 24 08/30/2024    K 4.5 08/30/2024     08/30/2024    CO2 26 08/30/2024     eGFR   Date Value Ref Range Status   08/30/2024 109 ml/min/1.73sq m Final     Lab Results   Component Value Date    HDL 56 08/30/2024    TRIG 75 08/30/2024     Lab Results   Component Value Date    ALT 18 08/30/2024    AST 14 08/30/2024    ALKPHOS 48 08/30/2024     Lab Results   Component Value Date    QBS4HCZCVDHR 2.456 08/30/2024    ACV9TTDLKAQK 2.070 12/11/2018     Lab Results   Component Value Date    FREET4 1.05 12/11/2018       Impression & Plan:    Problem List Items Addressed This Visit       Type 1 diabetes mellitus without complication (HCC)       Lab Results   Component Value Date    HGBA1C 7.1 (H) 08/30/2024 "       Overall doing very well on Tresiba 16units daily and novolog with carb counting and correction factors as noted. UTD on eye exam.          Relevant Medications    Glucagon (Baqsimi One Pack) 3 MG/DOSE POWD    Insulin Pen Needle (BD Pen Needle Estelle 2nd Gen) 32G X 4 MM MISC    Other Relevant Orders    Hemoglobin A1C    Basic metabolic panel    Current use of insulin (HCC) - Primary    Relevant Medications    Glucagon (Baqsimi One Pack) 3 MG/DOSE POWD    Insulin Pen Needle (BD Pen Needle Estelle 2nd Gen) 32G X 4 MM MISC    Other Relevant Orders    Hemoglobin A1C    Basic metabolic panel           Plan RTC in 5-6mos    Discussed with the patient and all questioned fully answered. He will call me if any problems arise.  Elizabeth Vargas MD

## 2024-11-18 DIAGNOSIS — Z79.4 CURRENT USE OF INSULIN (HCC): ICD-10-CM

## 2024-11-18 DIAGNOSIS — E10.649 TYPE 1 DIABETES MELLITUS WITH HYPOGLYCEMIA AND WITHOUT COMA (HCC): ICD-10-CM

## 2024-11-19 RX ORDER — ACYCLOVIR 400 MG/1
1 TABLET ORAL
Qty: 3 EACH | Refills: 1 | Status: SHIPPED | OUTPATIENT
Start: 2024-11-19

## 2024-12-18 ENCOUNTER — OFFICE VISIT (OUTPATIENT)
Dept: FAMILY MEDICINE CLINIC | Facility: CLINIC | Age: 34
End: 2024-12-18
Payer: COMMERCIAL

## 2024-12-18 VITALS
OXYGEN SATURATION: 98 % | BODY MASS INDEX: 24.26 KG/M2 | TEMPERATURE: 98.4 F | WEIGHT: 189 LBS | DIASTOLIC BLOOD PRESSURE: 86 MMHG | SYSTOLIC BLOOD PRESSURE: 130 MMHG | HEART RATE: 85 BPM | RESPIRATION RATE: 17 BRPM | HEIGHT: 74 IN

## 2024-12-18 DIAGNOSIS — R22.2 ABDOMINAL WALL LUMP: Primary | ICD-10-CM

## 2024-12-18 PROCEDURE — 99213 OFFICE O/P EST LOW 20 MIN: CPT | Performed by: FAMILY MEDICINE

## 2024-12-18 NOTE — PROGRESS NOTES
"Name: Raymundo Rubio      : 1990      MRN: [id number]  Encounter Provider: Stephany Powell MD  Encounter Date: 2024   Encounter department: [facility]  :  Assessment & Plan  Abdominal wall lump  ? Part of sternum   To r/o small hernia    Orders:  •  US abdominal wall; Future           History of Present Illness     HPI  Noticed  a lump over mid chest for 10 days   No pain   No injury or trauma that he could recall      Review of Systems   Constitutional: Negative.    HENT: Negative.     Respiratory: Negative.     Cardiovascular: Negative.    Genitourinary: Negative.    Skin:  Negative for rash and wound.       Objective   /86 (BP Location: Left arm, Patient Position: Sitting, Cuff Size: Standard)   Pulse 85   Temp 98.4 °F (36.9 °C) (Tympanic)   Resp 17   Ht 6' 2\" (1.88 m)   Wt 85.7 kg (189 lb)   SpO2 98%   BMI 24.27 kg/m²      Physical Exam  Vitals and nursing note reviewed.   Constitutional:       Appearance: Normal appearance.   Cardiovascular:      Rate and Rhythm: Normal rate and regular rhythm.   Musculoskeletal:         General: No swelling or tenderness.   Skin:     Comments: Small mobile lump under sternum   Neurological:      Mental Status: He is alert.         "

## 2024-12-30 DIAGNOSIS — I10 ESSENTIAL HYPERTENSION: ICD-10-CM

## 2024-12-30 RX ORDER — LISINOPRIL 10 MG/1
10 TABLET ORAL DAILY
Qty: 90 TABLET | Refills: 1 | Status: SHIPPED | OUTPATIENT
Start: 2024-12-30
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
## 2025-01-09 ENCOUNTER — HOSPITAL ENCOUNTER (OUTPATIENT)
Dept: RADIOLOGY | Age: 35
Discharge: HOME | End: 2025-01-09
Payer: COMMERCIAL

## 2025-01-09 DIAGNOSIS — R22.2 ABDOMINAL WALL LUMP: ICD-10-CM

## 2025-01-09 PROCEDURE — 76705 ECHO EXAM OF ABDOMEN: CPT

## 2025-01-14 ENCOUNTER — RESULTS FOLLOW-UP (OUTPATIENT)
Dept: FAMILY MEDICINE CLINIC | Facility: CLINIC | Age: 35
End: 2025-01-14

## 2025-01-29 DIAGNOSIS — E10.9 TYPE 1 DIABETES MELLITUS WITHOUT COMPLICATION (HCC): ICD-10-CM

## 2025-01-29 DIAGNOSIS — E10.649 TYPE 1 DIABETES MELLITUS WITH HYPOGLYCEMIA AND WITHOUT COMA (HCC): ICD-10-CM

## 2025-01-29 DIAGNOSIS — Z79.4 CURRENT USE OF INSULIN (HCC): ICD-10-CM

## 2025-01-29 RX ORDER — ACYCLOVIR 400 MG/1
1 TABLET ORAL
Qty: 3 EACH | Refills: 5 | Status: SHIPPED | OUTPATIENT
Start: 2025-01-29

## 2025-01-29 RX ORDER — INSULIN LISPRO 100 [IU]/ML
INJECTION, SOLUTION INTRAVENOUS; SUBCUTANEOUS
Qty: 30 ML | Refills: 1 | Status: SHIPPED | OUTPATIENT
Start: 2025-01-29

## 2025-01-29 NOTE — TELEPHONE ENCOUNTER
Reason for call:   [x] Refill   [] Prior Auth  [x] Other: Not a Duplicate - Phamracy did not receive new script.    Office:   [] PCP/Provider -   [x] Specialty/Provider - [facility] - Elizabeth Vargas MD     Medication: HumaLOG KwikPen 100 units/mL injection pen     Dose/Frequency: INJECT 10 UNITS UNDER THE SKIN UP TO 3 TIMES DAILY WITH CARB COUNTING AS DIRECTED BY MD     Quantity: 30 mL    Pharmacy: [id number] - [place], [place] - [address] [place] [address] [phone]    Does the patient have enough for 3 days?   [x] Yes   [] No - Send as HP to POD

## 2025-02-05 LAB
LEFT EYE DIABETIC RETINOPATHY: NORMAL
RIGHT EYE DIABETIC RETINOPATHY: NORMAL

## 2025-02-26 ENCOUNTER — OFFICE VISIT (OUTPATIENT)
Dept: FAMILY MEDICINE CLINIC | Facility: CLINIC | Age: 35
End: 2025-02-26
Payer: COMMERCIAL

## 2025-02-26 VITALS
OXYGEN SATURATION: 99 % | TEMPERATURE: 97.6 F | HEIGHT: 75 IN | DIASTOLIC BLOOD PRESSURE: 80 MMHG | WEIGHT: 193 LBS | SYSTOLIC BLOOD PRESSURE: 116 MMHG | HEART RATE: 61 BPM | BODY MASS INDEX: 24 KG/M2

## 2025-02-26 DIAGNOSIS — I10 ESSENTIAL HYPERTENSION: Primary | ICD-10-CM

## 2025-02-26 DIAGNOSIS — E10.649 TYPE 1 DIABETES MELLITUS WITH HYPOGLYCEMIA AND WITHOUT COMA (HCC): ICD-10-CM

## 2025-02-26 PROCEDURE — 99213 OFFICE O/P EST LOW 20 MIN: CPT | Performed by: NURSE PRACTITIONER

## 2025-02-26 NOTE — ASSESSMENT & PLAN NOTE
Stable with current management.; continue lisinopril.  Continue to monitor, repeat labs ordered by endocrinology already.

## 2025-02-26 NOTE — PROGRESS NOTES
"Name: Raymundo Rubio      : 1990      MRN: [id number]  Encounter Provider: MATT Dumont  Encounter Date: 2025   Encounter department: [facility]  :  Assessment & Plan  Essential hypertension  Stable with current management.; continue lisinopril.  Continue to monitor, repeat labs ordered by endocrinology already.       Type 1 diabetes mellitus with hypoglycemia and without coma (HCC)  Stable with current management.; continue management per endocrinology.  Had eye exam already.  Endocrinology does his foot exam and he sees them next month.  Follow up labs are already ordered as well.  Continue to monitor.   Lab Results   Component Value Date    HGBA1C 7.1 (H) 2024                   History of Present Illness   Pt is a 36 yo male here for 6 mo follow up.  Past medical history of diabetes type 1, HTN.  He has been doing well other than some occasional knee pain that he attributes to running.  He is following regularly with endocrinology and no changes in insulin dosing.      Review of Systems   Eyes:  Negative for visual disturbance.   Respiratory:  Negative for shortness of breath.    Cardiovascular:  Negative for chest pain, palpitations and leg swelling.   Gastrointestinal:  Negative for nausea and vomiting.   Musculoskeletal:  Positive for arthralgias.   Neurological:  Negative for dizziness and light-headedness.       Objective   /80   Pulse 61   Temp 97.6 °F (36.4 °C)   Ht 6' 3\" (1.905 m)   Wt 87.5 kg (193 lb)   SpO2 99%   BMI 24.12 kg/m²      Physical Exam  Vitals reviewed.   Constitutional:       General: He is awake. He is not in acute distress.     Appearance: Normal appearance. He is well-developed. He is not ill-appearing.   Eyes:      General: Lids are normal.      Conjunctiva/sclera: Conjunctivae normal.   Cardiovascular:      Rate and Rhythm: Normal rate and regular rhythm.      Pulses: Normal pulses.      Heart sounds: Normal heart " sounds. No murmur heard.  Pulmonary:      Effort: Pulmonary effort is normal. No tachypnea, accessory muscle usage or respiratory distress.      Breath sounds: Normal breath sounds.   Abdominal:      General: Abdomen is flat. Bowel sounds are normal.      Palpations: Abdomen is soft.      Tenderness: There is no abdominal tenderness.   Musculoskeletal:      Right lower leg: No edema.      Left lower leg: No edema.   Neurological:      Mental Status: He is alert and oriented to person, place, and time.   Psychiatric:         Attention and Perception: Attention normal.         Mood and Affect: Mood normal.         Speech: Speech normal.         Behavior: Behavior normal. Behavior is cooperative.         Thought Content: Thought content normal.         Cognition and Memory: Cognition normal.         Judgment: Judgment normal.

## 2025-03-27 ENCOUNTER — OFFICE VISIT (OUTPATIENT)
Dept: ENDOCRINOLOGY | Facility: CLINIC | Age: 35
End: 2025-03-27
Payer: COMMERCIAL

## 2025-03-27 VITALS
WEIGHT: 191.6 LBS | HEART RATE: 65 BPM | SYSTOLIC BLOOD PRESSURE: 130 MMHG | DIASTOLIC BLOOD PRESSURE: 80 MMHG | BODY MASS INDEX: 23.82 KG/M2 | HEIGHT: 75 IN

## 2025-03-27 DIAGNOSIS — E10.9 TYPE 1 DIABETES MELLITUS WITHOUT COMPLICATION (HCC): Primary | ICD-10-CM

## 2025-03-27 LAB — SL AMB POCT HEMOGLOBIN AIC: 6.5 (ref ?–6.5)

## 2025-03-27 PROCEDURE — 83036 HEMOGLOBIN GLYCOSYLATED A1C: CPT

## 2025-03-27 PROCEDURE — 95251 CONT GLUC MNTR ANALYSIS I&R: CPT

## 2025-03-27 PROCEDURE — 99214 OFFICE O/P EST MOD 30 MIN: CPT

## 2025-03-27 NOTE — PROGRESS NOTES
Name: Raymundo Rubio      : 1990      MRN: [id number]  Encounter Provider: MATT Sim  Encounter Date: 3/27/2025   Encounter department: [facility] [place]    Chief Complaint   Patient presents with    Diabetes Type 1     Assessment & Plan  1. Type 1 diabetes mellitus.  His hemoglobin A1c level is commendably at 6.5%, indicating good glycemic control. Blood pressure readings are within the normal range. He is advised to avoid processed foods, fried and fatty foods, simple sugars, and simple carbohydrates such as white pasta, potatoes, and white rice. He is also recommended to limit his intake of sugary drinks and sodas. A comprehensive metabolic panel, cholesterol level, and urine test will be ordered. He is instructed to conduct daily foot examinations and avoid walking barefoot. An order for hemoglobin A1c test will be placed for him to complete in 6 months.    2. Elevated cholesterol.  He reported that his previous cholesterol levels were slightly elevated. He has been advised to cut down on high saturated fats and processed foods. A cholesterol level test will be added to his lab orders.    3. Hypertension: BP stable in the office. Continue regimen     History of Present Illness  The patient is a 35-year-old male here for follow-up of type 1 diabetes with no complications of diabetes. Currently being treated with Tresiba 16 units daily and NovoLog with carb ratio of 1:10, sensitivity 1-30, blood glucose target 110. His most recent hemoglobin A1c is 6.5%.     He reports experiencing 1 or 2 episodes of hypoglycemia, with blood glucose levels dropping below 70. He also acknowledges occasional hyperglycemia, which he attributes to dietary indiscretions. He has not yet completed his laboratory tests. He does not experience any peripheral neuropathy symptoms such as numbness or tingling in his feet. He has been unable to maintain his regular running  routine due to an injury and has developed calluses on his feet. He expresses a preference for walking barefoot but ensures to perform regular foot checks.    He mentions that his cholesterol levels were slightly elevated during his last visit. He has made dietary modifications, including reducing his intake of lunch meat and high-saturated fats, in an attempt to manage his cholesterol levels.    MEDICATIONS  Tresiba, NovoLog    CGM Interpretation  Raymundo Rubio   Device used Dexcom for Personal Use  Indication: Type of Diabetes: Type 2 Diabetes  More than 72 hours of data was reviewed. Report to be scanned to chart.   Date Range: March 13 to March 26, 2025  Analysis of data:   Average Glucose: 164 mg/dl  Coefficient of Variation: 30.7%  SD : 50 mg/dl  Time in Target Range: 68 %  Time Above Range: high: 26%; very high: 6%  Time Below Range: 0%   Interpretation of data: periods of hyperglycemia around mealtimes which is inconsistent day to day    Last Eye Exam: 02/05/2025  Last Foot Exam: done todayDiabetic Foot Exam    Patient's shoes and socks removed.    Right Foot/Ankle   Right Foot Inspection  Skin Exam: skin intact.     Toe Exam: ROM and strength within normal limits.     Sensory   Vibration: diminished  Monofilament testing: intact    Vascular  The right DP pulse is 2+.     Left Foot/Ankle  Left Foot Inspection  Skin Exam: skin intact.     Toe Exam: ROM and strength within normal limits.     Sensory   Vibration: diminished  Monofilament testing: intact    Vascular  The left DP pulse is 2+.     Assign Risk Category  No deformity present  No loss of protective sensation  No weak pulses  Risk: 0      Health Maintenance   Topic Date Due    Diabetic Foot Exam  03/27/2026    Diabetic Eye Exam  02/05/2027         Review of Systems   Constitutional:  Negative for chills and fever.   HENT:  Negative for ear pain and sore throat.    Eyes:  Negative for pain and visual disturbance.   Respiratory:  Negative for cough  "and shortness of breath.    Cardiovascular:  Negative for chest pain and palpitations.   Gastrointestinal:  Negative for abdominal pain and vomiting.   Genitourinary:  Negative for dysuria and hematuria.   Musculoskeletal:  Negative for arthralgias and back pain.   Skin:  Negative for color change and rash.   Neurological:  Negative for seizures and syncope.   All other systems reviewed and are negative.   as per HPI         Medical History Reviewed by provider this encounter:     .    Objective   /80   Pulse 65   Ht 6' 3\" (1.905 m)   Wt 86.9 kg (191 lb 9.6 oz)   BMI 23.95 kg/m²      Body mass index is 23.95 kg/m².  Wt Readings from Last 3 Encounters:   03/27/25 86.9 kg (191 lb 9.6 oz)   02/26/25 87.5 kg (193 lb)   12/18/24 85.7 kg (189 lb)   Physical Exam    Physical Exam  Vitals reviewed.   HENT:      Head: Normocephalic and atraumatic.   Cardiovascular:      Rate and Rhythm: Normal rate.      Pulses: no weak pulses.           Dorsalis pedis pulses are 2+ on the right side and 2+ on the left side.   Pulmonary:      Effort: Pulmonary effort is normal.   Neurological:      Mental Status: He is alert.       Physical Exam      Results  Laboratory Studies  Hemoglobin A1c is 6.5%.  Labs:   Lab Results   Component Value Date    HGBA1C 6.5 03/27/2025    HGBA1C 7.1 (H) 08/30/2024    HGBA1C 6.9 (A) 03/26/2024     Lab Results   Component Value Date    CREATININE 0.92 08/30/2024    CREATININE 1.04 10/14/2022    CREATININE 0.93 12/11/2018    BUN 24 08/30/2024    K 4.5 08/30/2024     08/30/2024    CO2 26 08/30/2024     eGFR   Date Value Ref Range Status   08/30/2024 109 ml/min/1.73sq m Final     Lab Results   Component Value Date    HDL 56 08/30/2024    TRIG 75 08/30/2024     Lab Results   Component Value Date    ALT 18 08/30/2024    AST 14 08/30/2024    ALKPHOS 48 08/30/2024     Lab Results   Component Value Date    YVJ0TSXTMSLB 2.456 08/30/2024    AGV4RQNSJOAK 2.070 12/11/2018       There are no Patient " Instructions on file for this visit.    Discussed with the patient and all questioned fully answered. He will call me if any problems arise.

## 2025-07-05 DIAGNOSIS — I10 ESSENTIAL HYPERTENSION: ICD-10-CM

## 2025-07-06 RX ORDER — LISINOPRIL 10 MG/1
10 TABLET ORAL DAILY
Qty: 90 TABLET | Refills: 1 | Status: SHIPPED | OUTPATIENT
Start: 2025-07-06

## 2025-07-26 DIAGNOSIS — Z79.4 CURRENT USE OF INSULIN (HCC): ICD-10-CM

## 2025-07-26 DIAGNOSIS — E10.9 TYPE 1 DIABETES MELLITUS WITHOUT COMPLICATION (HCC): ICD-10-CM

## 2025-07-28 RX ORDER — INSULIN DEGLUDEC 100 U/ML
16 INJECTION, SOLUTION SUBCUTANEOUS DAILY
Qty: 30 ML | Refills: 0 | Status: SHIPPED | OUTPATIENT
Start: 2025-07-28

## 2025-07-28 RX ORDER — INSULIN LISPRO 100 [IU]/ML
INJECTION, SOLUTION INTRAVENOUS; SUBCUTANEOUS
Qty: 15 ML | Refills: 2 | Status: SHIPPED | OUTPATIENT
Start: 2025-07-28

## 2025-07-29 ENCOUNTER — TELEPHONE (OUTPATIENT)
Dept: ENDOCRINOLOGY | Facility: CLINIC | Age: 35
End: 2025-07-29

## 2025-07-30 DIAGNOSIS — E10.9 TYPE 1 DIABETES MELLITUS WITHOUT COMPLICATION (HCC): Primary | ICD-10-CM

## 2025-07-31 ENCOUNTER — TELEPHONE (OUTPATIENT)
Age: 35
End: 2025-07-31

## 2025-07-31 RX ORDER — INSULIN ASPART 100 [IU]/ML
INJECTION, SOLUTION INTRAVENOUS; SUBCUTANEOUS
Qty: 15 ML | Refills: 2 | Status: SHIPPED | OUTPATIENT
Start: 2025-07-31

## 2025-08-01 ENCOUNTER — TELEPHONE (OUTPATIENT)
Dept: ENDOCRINOLOGY | Facility: CLINIC | Age: 35
End: 2025-08-01

## 2025-08-01 ENCOUNTER — TELEPHONE (OUTPATIENT)
Age: 35
End: 2025-08-01

## 2025-08-04 DIAGNOSIS — E10.9 TYPE 1 DIABETES MELLITUS WITHOUT COMPLICATION (HCC): Primary | ICD-10-CM

## 2025-08-05 DIAGNOSIS — Z79.4 CURRENT USE OF INSULIN (HCC): ICD-10-CM

## 2025-08-05 DIAGNOSIS — E10.649 TYPE 1 DIABETES MELLITUS WITH HYPOGLYCEMIA AND WITHOUT COMA (HCC): ICD-10-CM

## 2025-08-05 RX ORDER — INSULIN ASPART INJECTION 100 [IU]/ML
INJECTION, SOLUTION SUBCUTANEOUS
Qty: 15 ML | Refills: 2 | Status: SHIPPED | OUTPATIENT
Start: 2025-08-05

## 2025-08-06 RX ORDER — ACYCLOVIR 400 MG/1
1 TABLET ORAL
Qty: 3 EACH | Refills: 5 | Status: SHIPPED | OUTPATIENT
Start: 2025-08-06